# Patient Record
Sex: MALE | Race: WHITE | Employment: OTHER | URBAN - METROPOLITAN AREA
[De-identification: names, ages, dates, MRNs, and addresses within clinical notes are randomized per-mention and may not be internally consistent; named-entity substitution may affect disease eponyms.]

---

## 2019-01-01 ENCOUNTER — HOSPITAL ENCOUNTER (OUTPATIENT)
Dept: CT IMAGING | Age: 79
Discharge: HOME | End: 2019-10-31
Payer: MEDICARE

## 2019-01-01 ENCOUNTER — HOSPITAL ENCOUNTER (OUTPATIENT)
Age: 79
Discharge: HOME | End: 2019-12-16
Payer: MEDICARE

## 2019-01-01 ENCOUNTER — HOSPITAL ENCOUNTER (OUTPATIENT)
Dept: GENERAL RADIOLOGY | Age: 79
Discharge: HOME | End: 2019-10-24
Payer: MEDICARE

## 2019-01-01 ENCOUNTER — HOSPITAL ENCOUNTER (OUTPATIENT)
Dept: SPEECH THERAPY | Age: 79
Setting detail: THERAPIES SERIES
Discharge: HOME | End: 2019-10-24
Payer: MEDICARE

## 2019-01-01 ENCOUNTER — OFFICE VISIT (OUTPATIENT)
Dept: PULMONOLOGY | Age: 79
End: 2019-01-01
Payer: MEDICARE

## 2019-01-01 VITALS
WEIGHT: 121 LBS | BODY MASS INDEX: 17.92 KG/M2 | RESPIRATION RATE: 17 BRPM | SYSTOLIC BLOOD PRESSURE: 125 MMHG | HEART RATE: 77 BPM | HEIGHT: 69 IN | TEMPERATURE: 97.4 F | OXYGEN SATURATION: 94 % | DIASTOLIC BLOOD PRESSURE: 74 MMHG

## 2019-01-01 DIAGNOSIS — R13.10 DYSPHAGIA, UNSPECIFIED TYPE: ICD-10-CM

## 2019-01-01 DIAGNOSIS — R91.1 LUNG NODULE: ICD-10-CM

## 2019-01-01 DIAGNOSIS — Z77.090 ASBESTOS EXPOSURE: ICD-10-CM

## 2019-01-01 DIAGNOSIS — J43.2 CENTRILOBULAR EMPHYSEMA (HCC): Primary | ICD-10-CM

## 2019-01-01 DIAGNOSIS — J43.2 CENTRILOBULAR EMPHYSEMA (HCC): ICD-10-CM

## 2019-01-01 DIAGNOSIS — J84.10 GRANULOMATOUS LUNG DISEASE (HCC): ICD-10-CM

## 2019-01-01 DIAGNOSIS — R91.1 PULMONARY NODULE: ICD-10-CM

## 2019-01-01 DIAGNOSIS — J47.9 BRONCHIECTASIS WITHOUT COMPLICATION (HCC): ICD-10-CM

## 2019-01-01 DIAGNOSIS — R93.89 ABNORMAL CT SCAN, CHEST: ICD-10-CM

## 2019-01-01 DIAGNOSIS — J43.1 PANLOBULAR EMPHYSEMA (HCC): ICD-10-CM

## 2019-01-01 LAB — ALPHA-1 ANTITRYPSIN: 111 MG/DL (ref 90–200)

## 2019-01-01 PROCEDURE — 92526 ORAL FUNCTION THERAPY: CPT

## 2019-01-01 PROCEDURE — 74230 X-RAY XM SWLNG FUNCJ C+: CPT

## 2019-01-01 PROCEDURE — 71250 CT THORAX DX C-: CPT

## 2019-01-01 PROCEDURE — 82103 ALPHA-1-ANTITRYPSIN TOTAL: CPT

## 2019-01-01 PROCEDURE — 99204 OFFICE O/P NEW MOD 45 MIN: CPT | Performed by: INTERNAL MEDICINE

## 2019-01-01 PROCEDURE — 92611 MOTION FLUOROSCOPY/SWALLOW: CPT

## 2019-01-01 RX ORDER — POLYETHYLENE GLYCOL 3350 17 G/17G
POWDER, FOR SOLUTION ORAL
COMMUNITY
Start: 2018-11-20 | End: 2020-01-01 | Stop reason: ALTCHOICE

## 2019-01-01 RX ORDER — BUDESONIDE AND FORMOTEROL FUMARATE DIHYDRATE 160; 4.5 UG/1; UG/1
AEROSOL RESPIRATORY (INHALATION)
Status: ON HOLD | COMMUNITY
Start: 2019-01-01 | End: 2020-01-01 | Stop reason: HOSPADM

## 2019-01-01 RX ORDER — TIOTROPIUM BROMIDE 18 UG/1
CAPSULE ORAL; RESPIRATORY (INHALATION)
Status: ON HOLD | COMMUNITY
Start: 2019-01-01 | End: 2020-01-01 | Stop reason: HOSPADM

## 2019-01-01 RX ORDER — LEVOTHYROXINE SODIUM 0.05 MG/1
TABLET ORAL
COMMUNITY
Start: 2019-01-01

## 2019-01-01 RX ORDER — CARVEDILOL 6.25 MG/1
TABLET ORAL
COMMUNITY
Start: 2019-01-01 | End: 2020-01-01 | Stop reason: ALTCHOICE

## 2019-01-01 ASSESSMENT — ENCOUNTER SYMPTOMS
RHINORRHEA: 1
COUGH: 1
SHORTNESS OF BREATH: 1

## 2020-01-01 ENCOUNTER — OFFICE VISIT (OUTPATIENT)
Dept: PULMONOLOGY | Age: 80
End: 2020-01-01
Payer: MEDICARE

## 2020-01-01 ENCOUNTER — APPOINTMENT (OUTPATIENT)
Dept: CT IMAGING | Age: 80
DRG: 308 | End: 2020-01-01
Payer: MEDICARE

## 2020-01-01 ENCOUNTER — APPOINTMENT (OUTPATIENT)
Dept: GENERAL RADIOLOGY | Age: 80
DRG: 308 | End: 2020-01-01
Payer: MEDICARE

## 2020-01-01 ENCOUNTER — HOSPITAL ENCOUNTER (INPATIENT)
Age: 80
LOS: 5 days | Discharge: HOSPICE | DRG: 308 | End: 2020-07-11
Attending: EMERGENCY MEDICINE | Admitting: INTERNAL MEDICINE
Payer: MEDICARE

## 2020-01-01 ENCOUNTER — HOSPITAL ENCOUNTER (OUTPATIENT)
Dept: PULMONOLOGY | Age: 80
Discharge: HOME | End: 2020-02-10
Payer: MEDICARE

## 2020-01-01 ENCOUNTER — HOSPITAL ENCOUNTER (OUTPATIENT)
Dept: CT IMAGING | Age: 80
Discharge: HOME | End: 2020-02-03
Payer: MEDICARE

## 2020-01-01 ENCOUNTER — APPOINTMENT (OUTPATIENT)
Dept: ULTRASOUND IMAGING | Age: 80
DRG: 308 | End: 2020-01-01
Payer: MEDICARE

## 2020-01-01 VITALS
WEIGHT: 123.2 LBS | SYSTOLIC BLOOD PRESSURE: 126 MMHG | TEMPERATURE: 98.1 F | RESPIRATION RATE: 17 BRPM | DIASTOLIC BLOOD PRESSURE: 77 MMHG | OXYGEN SATURATION: 94 % | HEIGHT: 69 IN | HEART RATE: 112 BPM | BODY MASS INDEX: 18.25 KG/M2

## 2020-01-01 VITALS
TEMPERATURE: 97.6 F | HEIGHT: 71 IN | HEART RATE: 94 BPM | BODY MASS INDEX: 18.36 KG/M2 | DIASTOLIC BLOOD PRESSURE: 66 MMHG | OXYGEN SATURATION: 92 % | SYSTOLIC BLOOD PRESSURE: 108 MMHG | WEIGHT: 131.17 LBS | RESPIRATION RATE: 16 BRPM

## 2020-01-01 LAB
A/G RATIO: 1.1 (ref 1.1–2.2)
A/G RATIO: 1.5 (ref 1.1–2.2)
ALBUMIN SERPL-MCNC: 2.7 G/DL (ref 3.4–5)
ALBUMIN SERPL-MCNC: 2.7 G/DL (ref 3.4–5)
ALBUMIN SERPL-MCNC: 3 G/DL (ref 3.4–5)
ALBUMIN SERPL-MCNC: 3.2 G/DL (ref 3.4–5)
ALBUMIN SERPL-MCNC: 3.2 G/DL (ref 3.4–5)
ALP BLD-CCNC: 72 U/L (ref 40–129)
ALP BLD-CCNC: 76 U/L (ref 40–129)
ALP BLD-CCNC: 82 U/L (ref 40–129)
ALP BLD-CCNC: 83 U/L (ref 40–129)
ALP BLD-CCNC: 99 U/L (ref 40–129)
ALT SERPL-CCNC: 115 U/L (ref 10–40)
ALT SERPL-CCNC: 191 U/L (ref 10–40)
ALT SERPL-CCNC: 249 U/L (ref 10–40)
ALT SERPL-CCNC: 67 U/L (ref 10–40)
ALT SERPL-CCNC: 98 U/L (ref 10–40)
AMMONIA: 15 UMOL/L (ref 16–60)
ANION GAP SERPL CALCULATED.3IONS-SCNC: 10 MMOL/L (ref 3–16)
ANION GAP SERPL CALCULATED.3IONS-SCNC: 13 MMOL/L (ref 3–16)
ANION GAP SERPL CALCULATED.3IONS-SCNC: 13 MMOL/L (ref 3–16)
ANION GAP SERPL CALCULATED.3IONS-SCNC: 14 MMOL/L (ref 3–16)
ANION GAP SERPL CALCULATED.3IONS-SCNC: 15 MMOL/L (ref 3–16)
ANION GAP SERPL CALCULATED.3IONS-SCNC: 16 MMOL/L (ref 3–16)
AST SERPL-CCNC: 161 U/L (ref 15–37)
AST SERPL-CCNC: 406 U/L (ref 15–37)
AST SERPL-CCNC: 62 U/L (ref 15–37)
AST SERPL-CCNC: 82 U/L (ref 15–37)
AST SERPL-CCNC: 82 U/L (ref 15–37)
BACTERIA: ABNORMAL /HPF
BASOPHILS ABSOLUTE: 0 K/UL (ref 0–0.2)
BASOPHILS RELATIVE PERCENT: 0 %
BASOPHILS RELATIVE PERCENT: 0.1 %
BASOPHILS RELATIVE PERCENT: 0.3 %
BILIRUB SERPL-MCNC: 1.7 MG/DL (ref 0–1)
BILIRUB SERPL-MCNC: 1.9 MG/DL (ref 0–1)
BILIRUB SERPL-MCNC: 1.9 MG/DL (ref 0–1)
BILIRUB SERPL-MCNC: 2.1 MG/DL (ref 0–1)
BILIRUB SERPL-MCNC: 2.5 MG/DL (ref 0–1)
BILIRUBIN DIRECT: 0.7 MG/DL (ref 0–0.3)
BILIRUBIN DIRECT: 0.8 MG/DL (ref 0–0.3)
BILIRUBIN DIRECT: 1.1 MG/DL (ref 0–0.3)
BILIRUBIN URINE: ABNORMAL
BILIRUBIN, INDIRECT: 1.1 MG/DL (ref 0–1)
BILIRUBIN, INDIRECT: 1.2 MG/DL (ref 0–1)
BILIRUBIN, INDIRECT: 1.4 MG/DL (ref 0–1)
BLOOD, URINE: ABNORMAL
BUN BLDV-MCNC: 22 MG/DL (ref 7–20)
BUN BLDV-MCNC: 27 MG/DL (ref 7–20)
BUN BLDV-MCNC: 29 MG/DL (ref 7–20)
BUN BLDV-MCNC: 30 MG/DL (ref 7–20)
BUN BLDV-MCNC: 31 MG/DL (ref 7–20)
BUN BLDV-MCNC: 31 MG/DL (ref 7–20)
CALCIUM SERPL-MCNC: 7.5 MG/DL (ref 8.3–10.6)
CALCIUM SERPL-MCNC: 8.2 MG/DL (ref 8.3–10.6)
CALCIUM SERPL-MCNC: 8.3 MG/DL (ref 8.3–10.6)
CALCIUM SERPL-MCNC: 8.3 MG/DL (ref 8.3–10.6)
CALCIUM SERPL-MCNC: 8.4 MG/DL (ref 8.3–10.6)
CALCIUM SERPL-MCNC: 9.1 MG/DL (ref 8.3–10.6)
CHLORIDE BLD-SCNC: 93 MMOL/L (ref 99–110)
CHLORIDE BLD-SCNC: 95 MMOL/L (ref 99–110)
CHLORIDE BLD-SCNC: 95 MMOL/L (ref 99–110)
CHLORIDE BLD-SCNC: 96 MMOL/L (ref 99–110)
CHLORIDE BLD-SCNC: 97 MMOL/L (ref 99–110)
CHLORIDE BLD-SCNC: 98 MMOL/L (ref 99–110)
CHLORIDE URINE RANDOM: 30 MMOL/L
CLARITY: CLEAR
CO2: 19 MMOL/L (ref 21–32)
CO2: 20 MMOL/L (ref 21–32)
CO2: 21 MMOL/L (ref 21–32)
CO2: 21 MMOL/L (ref 21–32)
CO2: 24 MMOL/L (ref 21–32)
CO2: 26 MMOL/L (ref 21–32)
COLOR: ABNORMAL
CREAT SERPL-MCNC: 0.7 MG/DL (ref 0.8–1.3)
CREAT SERPL-MCNC: 0.8 MG/DL (ref 0.8–1.3)
CREAT SERPL-MCNC: 0.8 MG/DL (ref 0.8–1.3)
CREAT SERPL-MCNC: 1.1 MG/DL (ref 0.8–1.3)
CREAT SERPL-MCNC: 1.2 MG/DL (ref 0.8–1.3)
CREAT SERPL-MCNC: 1.3 MG/DL (ref 0.8–1.3)
DLCO %PRED: 32 %
DLCO PRED: NORMAL
DLCO/VA %PRED: NORMAL
DLCO/VA PRED: NORMAL
DLCO/VA: NORMAL
DLCO: NORMAL
EKG ATRIAL RATE: 153 BPM
EKG ATRIAL RATE: 95 BPM
EKG DIAGNOSIS: NORMAL
EKG DIAGNOSIS: NORMAL
EKG Q-T INTERVAL: 320 MS
EKG Q-T INTERVAL: 442 MS
EKG QRS DURATION: 124 MS
EKG QRS DURATION: 126 MS
EKG QTC CALCULATION (BAZETT): 515 MS
EKG QTC CALCULATION (BAZETT): 555 MS
EKG R AXIS: 263 DEGREES
EKG R AXIS: 263 DEGREES
EKG T AXIS: 72 DEGREES
EKG T AXIS: 72 DEGREES
EKG VENTRICULAR RATE: 156 BPM
EKG VENTRICULAR RATE: 95 BPM
EOSINOPHILS ABSOLUTE: 0 K/UL (ref 0–0.6)
EOSINOPHILS ABSOLUTE: 0 K/UL (ref 0–0.6)
EOSINOPHILS ABSOLUTE: 0.1 K/UL (ref 0–0.6)
EOSINOPHILS RELATIVE PERCENT: 0.1 %
EOSINOPHILS RELATIVE PERCENT: 0.2 %
EOSINOPHILS RELATIVE PERCENT: 0.5 %
EPITHELIAL CELLS, UA: ABNORMAL /HPF (ref 0–5)
ESTIMATED AVERAGE GLUCOSE: 128.4 MG/DL
EXPIRATORY TIME-POST: NORMAL
EXPIRATORY TIME: NORMAL
FEF 25-75% %CHNG: NORMAL
FEF 25-75% %PRED-POST: NORMAL
FEF 25-75% %PRED-PRE: NORMAL
FEF 25-75% PRED: NORMAL
FEF 25-75%-POST: NORMAL
FEF 25-75%-PRE: NORMAL
FEV1 %PRED-POST: 47 %
FEV1 %PRED-PRE: 42 %
FEV1 PRED: NORMAL
FEV1-POST: NORMAL
FEV1-PRE: NORMAL
FEV1/FVC %PRED-POST: NORMAL
FEV1/FVC %PRED-PRE: NORMAL
FEV1/FVC PRED: NORMAL
FEV1/FVC-POST: 50 %
FEV1/FVC-PRE: 48 %
FVC %PRED-POST: NORMAL
FVC %PRED-PRE: NORMAL
FVC PRED: NORMAL
FVC-POST: NORMAL
FVC-PRE: NORMAL
GAW %PRED: NORMAL
GAW PRED: NORMAL
GAW: NORMAL
GFR AFRICAN AMERICAN: >60
GFR NON-AFRICAN AMERICAN: 53
GFR NON-AFRICAN AMERICAN: 58
GFR NON-AFRICAN AMERICAN: >60
GLOBULIN: 2.2 G/DL
GLOBULIN: 2.9 G/DL
GLUCOSE BLD-MCNC: 109 MG/DL (ref 70–99)
GLUCOSE BLD-MCNC: 113 MG/DL (ref 70–99)
GLUCOSE BLD-MCNC: 118 MG/DL (ref 70–99)
GLUCOSE BLD-MCNC: 132 MG/DL (ref 70–99)
GLUCOSE BLD-MCNC: 172 MG/DL (ref 70–99)
GLUCOSE BLD-MCNC: 98 MG/DL (ref 70–99)
GLUCOSE URINE: NEGATIVE MG/DL
HAV IGM SER IA-ACNC: NORMAL
HBA1C MFR BLD: 6.1 %
HCT VFR BLD CALC: 45.9 % (ref 40.5–52.5)
HCT VFR BLD CALC: 46 % (ref 40.5–52.5)
HCT VFR BLD CALC: 48 % (ref 40.5–52.5)
HCT VFR BLD CALC: 53 % (ref 40.5–52.5)
HEMATOLOGY PATH CONSULT: NORMAL
HEMATOLOGY PATH CONSULT: YES
HEMOGLOBIN: 15.3 G/DL (ref 13.5–17.5)
HEMOGLOBIN: 15.6 G/DL (ref 13.5–17.5)
HEMOGLOBIN: 16.2 G/DL (ref 13.5–17.5)
HEMOGLOBIN: 17.9 G/DL (ref 13.5–17.5)
HEPATITIS B CORE IGM ANTIBODY: NORMAL
HEPATITIS B SURFACE ANTIGEN INTERPRETATION: NORMAL
HEPATITIS C ANTIBODY INTERPRETATION: NORMAL
IC %PRED: NORMAL
IC PRED: NORMAL
IC: NORMAL
INR BLD: 1.99 (ref 0.86–1.14)
INR BLD: 2.14 (ref 0.86–1.14)
INR BLD: 2.34 (ref 0.86–1.14)
KETONES, URINE: NEGATIVE MG/DL
LEUKOCYTE ESTERASE, URINE: NEGATIVE
LV EF: 13 %
LVEF MODALITY: NORMAL
LYMPHOCYTES ABSOLUTE: 0.5 K/UL (ref 1–5.1)
LYMPHOCYTES ABSOLUTE: 0.6 K/UL (ref 1–5.1)
LYMPHOCYTES ABSOLUTE: 0.8 K/UL (ref 1–5.1)
LYMPHOCYTES RELATIVE PERCENT: 4.5 %
LYMPHOCYTES RELATIVE PERCENT: 5 %
LYMPHOCYTES RELATIVE PERCENT: 6.6 %
MCH RBC QN AUTO: 32.3 PG (ref 26–34)
MCH RBC QN AUTO: 32.4 PG (ref 26–34)
MCH RBC QN AUTO: 32.6 PG (ref 26–34)
MCH RBC QN AUTO: 33 PG (ref 26–34)
MCHC RBC AUTO-ENTMCNC: 33.3 G/DL (ref 31–36)
MCHC RBC AUTO-ENTMCNC: 33.7 G/DL (ref 31–36)
MCHC RBC AUTO-ENTMCNC: 33.7 G/DL (ref 31–36)
MCHC RBC AUTO-ENTMCNC: 33.9 G/DL (ref 31–36)
MCV RBC AUTO: 96.1 FL (ref 80–100)
MCV RBC AUTO: 96.7 FL (ref 80–100)
MCV RBC AUTO: 96.8 FL (ref 80–100)
MCV RBC AUTO: 97.4 FL (ref 80–100)
MEP: NORMAL
MICROSCOPIC EXAMINATION: YES
MIP: NORMAL
MONOCYTES ABSOLUTE: 1 K/UL (ref 0–1.3)
MONOCYTES ABSOLUTE: 1.1 K/UL (ref 0–1.3)
MONOCYTES ABSOLUTE: 1.1 K/UL (ref 0–1.3)
MONOCYTES RELATIVE PERCENT: 8.5 %
MONOCYTES RELATIVE PERCENT: 8.7 %
MONOCYTES RELATIVE PERCENT: 8.8 %
MVV %PRED-PRE: NORMAL
MVV PRED: NORMAL
MVV-PRE: NORMAL
NEUTROPHILS ABSOLUTE: 10.4 K/UL (ref 1.7–7.7)
NEUTROPHILS ABSOLUTE: 10.5 K/UL (ref 1.7–7.7)
NEUTROPHILS ABSOLUTE: 10.8 K/UL (ref 1.7–7.7)
NEUTROPHILS RELATIVE PERCENT: 84.1 %
NEUTROPHILS RELATIVE PERCENT: 85.8 %
NEUTROPHILS RELATIVE PERCENT: 86.8 %
NITRITE, URINE: NEGATIVE
OSMOLALITY URINE: 781 MOSM/KG (ref 390–1070)
OSMOLALITY: 281 MOSM/KG (ref 280–301)
PDW BLD-RTO: 14.5 % (ref 12.4–15.4)
PDW BLD-RTO: 14.7 % (ref 12.4–15.4)
PDW BLD-RTO: 14.7 % (ref 12.4–15.4)
PDW BLD-RTO: 14.8 % (ref 12.4–15.4)
PEF %PRED-POST: NORMAL
PEF %PRED-PRE: NORMAL
PEF PRED: NORMAL
PEF%CHNG: NORMAL
PEF-POST: NORMAL
PEF-PRE: NORMAL
PH UA: 5.5 (ref 5–8)
PLATELET # BLD: 15 K/UL (ref 135–450)
PLATELET # BLD: 58 K/UL (ref 135–450)
PLATELET # BLD: 61 K/UL (ref 135–450)
PLATELET # BLD: 64 K/UL (ref 135–450)
PLATELET SLIDE REVIEW: ABNORMAL
PMV BLD AUTO: 11.1 FL (ref 5–10.5)
PMV BLD AUTO: 11.8 FL (ref 5–10.5)
PMV BLD AUTO: 12.2 FL (ref 5–10.5)
PMV BLD AUTO: 12.7 FL (ref 5–10.5)
POTASSIUM SERPL-SCNC: 3.6 MMOL/L (ref 3.5–5.1)
POTASSIUM SERPL-SCNC: 3.7 MMOL/L (ref 3.5–5.1)
POTASSIUM SERPL-SCNC: 4.1 MMOL/L (ref 3.5–5.1)
POTASSIUM SERPL-SCNC: 4.3 MMOL/L (ref 3.5–5.1)
POTASSIUM SERPL-SCNC: 4.7 MMOL/L (ref 3.5–5.1)
POTASSIUM SERPL-SCNC: 4.7 MMOL/L (ref 3.5–5.1)
POTASSIUM, UR: 46.8 MMOL/L
PRO-BNP: ABNORMAL PG/ML (ref 0–449)
PROSTATE SPECIFIC ANTIGEN: 0.92 NG/ML (ref 0–4)
PROTEIN UA: 30 MG/DL
PROTHROMBIN TIME: 23.2 SEC (ref 10–13.2)
PROTHROMBIN TIME: 25 SEC (ref 10–13.2)
PROTHROMBIN TIME: 27.4 SEC (ref 10–13.2)
RAW %PRED: NORMAL
RAW PRED: NORMAL
RAW: NORMAL
RBC # BLD: 4.73 M/UL (ref 4.2–5.9)
RBC # BLD: 4.74 M/UL (ref 4.2–5.9)
RBC # BLD: 4.97 M/UL (ref 4.2–5.9)
RBC # BLD: 5.52 M/UL (ref 4.2–5.9)
RBC UA: ABNORMAL /HPF (ref 0–4)
REASON FOR REJECTION: NORMAL
REJECTED TEST: NORMAL
RV %PRED: NORMAL
RV PRED: NORMAL
RV: NORMAL
SLIDE REVIEW: ABNORMAL
SODIUM BLD-SCNC: 130 MMOL/L (ref 136–145)
SODIUM BLD-SCNC: 130 MMOL/L (ref 136–145)
SODIUM BLD-SCNC: 131 MMOL/L (ref 136–145)
SODIUM BLD-SCNC: 131 MMOL/L (ref 136–145)
SODIUM BLD-SCNC: 132 MMOL/L (ref 136–145)
SODIUM BLD-SCNC: 132 MMOL/L (ref 136–145)
SODIUM URINE: <20 MMOL/L
SPECIFIC GRAVITY UA: >=1.03 (ref 1–1.03)
SVC %PRED: NORMAL
SVC PRED: NORMAL
SVC: NORMAL
T4 FREE: 1.7 NG/DL (ref 0.9–1.8)
TLC %PRED: 100 %
TLC PRED: NORMAL
TLC: NORMAL
TOTAL PROTEIN: 4.7 G/DL (ref 6.4–8.2)
TOTAL PROTEIN: 5.1 G/DL (ref 6.4–8.2)
TOTAL PROTEIN: 5.3 G/DL (ref 6.4–8.2)
TOTAL PROTEIN: 5.4 G/DL (ref 6.4–8.2)
TOTAL PROTEIN: 6.1 G/DL (ref 6.4–8.2)
TROPONIN: 0.04 NG/ML
TSH REFLEX: 6.06 UIU/ML (ref 0.27–4.2)
URINE TYPE: ABNORMAL
UROBILINOGEN, URINE: 1 E.U./DL
VA %PRED: NORMAL
VA PRED: NORMAL
VA: NORMAL
VTG %PRED: NORMAL
VTG PRED: NORMAL
VTG: NORMAL
WBC # BLD: 10 K/UL (ref 4–11)
WBC # BLD: 12 K/UL (ref 4–11)
WBC # BLD: 12.4 K/UL (ref 4–11)
WBC # BLD: 12.6 K/UL (ref 4–11)
WBC UA: ABNORMAL /HPF (ref 0–5)

## 2020-01-01 PROCEDURE — G0009 ADMIN PNEUMOCOCCAL VACCINE: HCPCS | Performed by: INTERNAL MEDICINE

## 2020-01-01 PROCEDURE — 80048 BASIC METABOLIC PNL TOTAL CA: CPT

## 2020-01-01 PROCEDURE — 97116 GAIT TRAINING THERAPY: CPT

## 2020-01-01 PROCEDURE — 85610 PROTHROMBIN TIME: CPT

## 2020-01-01 PROCEDURE — 85025 COMPLETE CBC W/AUTO DIFF WBC: CPT

## 2020-01-01 PROCEDURE — 83935 ASSAY OF URINE OSMOLALITY: CPT

## 2020-01-01 PROCEDURE — 51702 INSERT TEMP BLADDER CATH: CPT

## 2020-01-01 PROCEDURE — 1200000000 HC SEMI PRIVATE

## 2020-01-01 PROCEDURE — 71250 CT THORAX DX C-: CPT

## 2020-01-01 PROCEDURE — 6370000000 HC RX 637 (ALT 250 FOR IP): Performed by: INTERNAL MEDICINE

## 2020-01-01 PROCEDURE — 96374 THER/PROPH/DIAG INJ IV PUSH: CPT

## 2020-01-01 PROCEDURE — 36415 COLL VENOUS BLD VENIPUNCTURE: CPT

## 2020-01-01 PROCEDURE — 97530 THERAPEUTIC ACTIVITIES: CPT

## 2020-01-01 PROCEDURE — 80053 COMPREHEN METABOLIC PANEL: CPT

## 2020-01-01 PROCEDURE — 6370000000 HC RX 637 (ALT 250 FOR IP): Performed by: UROLOGY

## 2020-01-01 PROCEDURE — 84484 ASSAY OF TROPONIN QUANT: CPT

## 2020-01-01 PROCEDURE — 85027 COMPLETE CBC AUTOMATED: CPT

## 2020-01-01 PROCEDURE — 82436 ASSAY OF URINE CHLORIDE: CPT

## 2020-01-01 PROCEDURE — 93010 ELECTROCARDIOGRAM REPORT: CPT | Performed by: INTERNAL MEDICINE

## 2020-01-01 PROCEDURE — 2580000003 HC RX 258: Performed by: INTERNAL MEDICINE

## 2020-01-01 PROCEDURE — 2580000003 HC RX 258: Performed by: NURSE PRACTITIONER

## 2020-01-01 PROCEDURE — 83930 ASSAY OF BLOOD OSMOLALITY: CPT

## 2020-01-01 PROCEDURE — 94640 AIRWAY INHALATION TREATMENT: CPT

## 2020-01-01 PROCEDURE — 6360000002 HC RX W HCPCS: Performed by: NURSE PRACTITIONER

## 2020-01-01 PROCEDURE — 70450 CT HEAD/BRAIN W/O DYE: CPT

## 2020-01-01 PROCEDURE — 84443 ASSAY THYROID STIM HORMONE: CPT

## 2020-01-01 PROCEDURE — 99233 SBSQ HOSP IP/OBS HIGH 50: CPT | Performed by: INTERNAL MEDICINE

## 2020-01-01 PROCEDURE — 94726 PLETHYSMOGRAPHY LUNG VOLUMES: CPT

## 2020-01-01 PROCEDURE — 94060 EVALUATION OF WHEEZING: CPT

## 2020-01-01 PROCEDURE — 2500000003 HC RX 250 WO HCPCS: Performed by: INTERNAL MEDICINE

## 2020-01-01 PROCEDURE — 6360000004 HC RX CONTRAST MEDICATION: Performed by: INTERNAL MEDICINE

## 2020-01-01 PROCEDURE — 80076 HEPATIC FUNCTION PANEL: CPT

## 2020-01-01 PROCEDURE — 99284 EMERGENCY DEPT VISIT MOD MDM: CPT

## 2020-01-01 PROCEDURE — 97162 PT EVAL MOD COMPLEX 30 MIN: CPT

## 2020-01-01 PROCEDURE — C8929 TTE W OR WO FOL WCON,DOPPLER: HCPCS

## 2020-01-01 PROCEDURE — 97535 SELF CARE MNGMENT TRAINING: CPT

## 2020-01-01 PROCEDURE — 73080 X-RAY EXAM OF ELBOW: CPT

## 2020-01-01 PROCEDURE — 6370000000 HC RX 637 (ALT 250 FOR IP): Performed by: NURSE PRACTITIONER

## 2020-01-01 PROCEDURE — 90732 PPSV23 VACC 2 YRS+ SUBQ/IM: CPT | Performed by: INTERNAL MEDICINE

## 2020-01-01 PROCEDURE — 93005 ELECTROCARDIOGRAM TRACING: CPT | Performed by: EMERGENCY MEDICINE

## 2020-01-01 PROCEDURE — 71045 X-RAY EXAM CHEST 1 VIEW: CPT

## 2020-01-01 PROCEDURE — 83880 ASSAY OF NATRIURETIC PEPTIDE: CPT

## 2020-01-01 PROCEDURE — 84133 ASSAY OF URINE POTASSIUM: CPT

## 2020-01-01 PROCEDURE — 84153 ASSAY OF PSA TOTAL: CPT

## 2020-01-01 PROCEDURE — 2500000003 HC RX 250 WO HCPCS: Performed by: NURSE PRACTITIONER

## 2020-01-01 PROCEDURE — 6360000002 HC RX W HCPCS: Performed by: INTERNAL MEDICINE

## 2020-01-01 PROCEDURE — 99223 1ST HOSP IP/OBS HIGH 75: CPT | Performed by: INTERNAL MEDICINE

## 2020-01-01 PROCEDURE — 97166 OT EVAL MOD COMPLEX 45 MIN: CPT

## 2020-01-01 PROCEDURE — 84300 ASSAY OF URINE SODIUM: CPT

## 2020-01-01 PROCEDURE — 94618 PULMONARY STRESS TESTING: CPT

## 2020-01-01 PROCEDURE — 80074 ACUTE HEPATITIS PANEL: CPT

## 2020-01-01 PROCEDURE — 51798 US URINE CAPACITY MEASURE: CPT

## 2020-01-01 PROCEDURE — 83036 HEMOGLOBIN GLYCOSYLATED A1C: CPT

## 2020-01-01 PROCEDURE — 99232 SBSQ HOSP IP/OBS MODERATE 35: CPT | Performed by: NURSE PRACTITIONER

## 2020-01-01 PROCEDURE — 82140 ASSAY OF AMMONIA: CPT

## 2020-01-01 PROCEDURE — 94729 DIFFUSING CAPACITY: CPT

## 2020-01-01 PROCEDURE — 84439 ASSAY OF FREE THYROXINE: CPT

## 2020-01-01 PROCEDURE — 99233 SBSQ HOSP IP/OBS HIGH 50: CPT | Performed by: NURSE PRACTITIONER

## 2020-01-01 PROCEDURE — 76705 ECHO EXAM OF ABDOMEN: CPT

## 2020-01-01 PROCEDURE — 81001 URINALYSIS AUTO W/SCOPE: CPT

## 2020-01-01 PROCEDURE — 99214 OFFICE O/P EST MOD 30 MIN: CPT | Performed by: INTERNAL MEDICINE

## 2020-01-01 RX ORDER — SODIUM CHLORIDE 9 MG/ML
1000 INJECTION, SOLUTION INTRAVENOUS CONTINUOUS
Status: DISCONTINUED | OUTPATIENT
Start: 2020-01-01 | End: 2020-01-01

## 2020-01-01 RX ORDER — METOPROLOL SUCCINATE 25 MG/1
25 TABLET, EXTENDED RELEASE ORAL DAILY
Qty: 30 TABLET | Refills: 3 | Status: SHIPPED | OUTPATIENT
Start: 2020-01-01

## 2020-01-01 RX ORDER — METOPROLOL SUCCINATE 25 MG/1
25 TABLET, EXTENDED RELEASE ORAL DAILY
Status: DISCONTINUED | OUTPATIENT
Start: 2020-01-01 | End: 2020-01-01 | Stop reason: HOSPADM

## 2020-01-01 RX ORDER — DILTIAZEM HYDROCHLORIDE 5 MG/ML
10 INJECTION INTRAVENOUS ONCE
Status: COMPLETED | OUTPATIENT
Start: 2020-01-01 | End: 2020-01-01

## 2020-01-01 RX ORDER — TAMSULOSIN HYDROCHLORIDE 0.4 MG/1
0.4 CAPSULE ORAL DAILY
Status: DISCONTINUED | OUTPATIENT
Start: 2020-01-01 | End: 2020-01-01 | Stop reason: HOSPADM

## 2020-01-01 RX ORDER — LORAZEPAM 2 MG/ML
1 CONCENTRATE ORAL EVERY 8 HOURS PRN
Qty: 30 ML | Refills: 0 | Status: SHIPPED | OUTPATIENT
Start: 2020-01-01 | End: 2020-01-01 | Stop reason: HOSPADM

## 2020-01-01 RX ORDER — BUDESONIDE AND FORMOTEROL FUMARATE DIHYDRATE 160; 4.5 UG/1; UG/1
2 AEROSOL RESPIRATORY (INHALATION) 2 TIMES DAILY
Status: DISCONTINUED | OUTPATIENT
Start: 2020-01-01 | End: 2020-01-01 | Stop reason: HOSPADM

## 2020-01-01 RX ORDER — ONDANSETRON 2 MG/ML
4 INJECTION INTRAMUSCULAR; INTRAVENOUS EVERY 6 HOURS PRN
Status: DISCONTINUED | OUTPATIENT
Start: 2020-01-01 | End: 2020-01-01

## 2020-01-01 RX ORDER — QUETIAPINE FUMARATE 25 MG/1
50 TABLET, FILM COATED ORAL ONCE
Status: COMPLETED | OUTPATIENT
Start: 2020-01-01 | End: 2020-01-01

## 2020-01-01 RX ORDER — LEVOTHYROXINE SODIUM 0.05 MG/1
50 TABLET ORAL DAILY
Status: DISCONTINUED | OUTPATIENT
Start: 2020-01-01 | End: 2020-01-01

## 2020-01-01 RX ORDER — ACETAMINOPHEN 650 MG/1
650 SUPPOSITORY RECTAL EVERY 6 HOURS PRN
Status: DISCONTINUED | OUTPATIENT
Start: 2020-01-01 | End: 2020-01-01 | Stop reason: HOSPADM

## 2020-01-01 RX ORDER — MORPHINE SULFATE 100 MG/5ML
10 SOLUTION ORAL
Qty: 30 ML | Refills: 0 | Status: SHIPPED | OUTPATIENT
Start: 2020-01-01 | End: 2020-07-16

## 2020-01-01 RX ORDER — HALOPERIDOL 5 MG/ML
2 INJECTION INTRAMUSCULAR EVERY 6 HOURS PRN
Status: DISCONTINUED | OUTPATIENT
Start: 2020-01-01 | End: 2020-01-01 | Stop reason: HOSPADM

## 2020-01-01 RX ORDER — ASPIRIN 81 MG/1
81 TABLET ORAL ONCE
Status: COMPLETED | OUTPATIENT
Start: 2020-01-01 | End: 2020-01-01

## 2020-01-01 RX ORDER — FINASTERIDE 5 MG/1
5 TABLET, FILM COATED ORAL DAILY
Status: DISCONTINUED | OUTPATIENT
Start: 2020-01-01 | End: 2020-01-01 | Stop reason: HOSPADM

## 2020-01-01 RX ORDER — HALOPERIDOL 5 MG/ML
5 INJECTION INTRAMUSCULAR ONCE
Status: COMPLETED | OUTPATIENT
Start: 2020-01-01 | End: 2020-01-01

## 2020-01-01 RX ORDER — SODIUM CHLORIDE 0.9 % (FLUSH) 0.9 %
10 SYRINGE (ML) INJECTION PRN
Status: DISCONTINUED | OUTPATIENT
Start: 2020-01-01 | End: 2020-01-01 | Stop reason: HOSPADM

## 2020-01-01 RX ORDER — POLYETHYLENE GLYCOL 3350 17 G/17G
17 POWDER, FOR SOLUTION ORAL DAILY PRN
Status: DISCONTINUED | OUTPATIENT
Start: 2020-01-01 | End: 2020-01-01 | Stop reason: HOSPADM

## 2020-01-01 RX ORDER — DILTIAZEM HYDROCHLORIDE 60 MG/1
30 TABLET, FILM COATED ORAL EVERY 6 HOURS SCHEDULED
Status: DISCONTINUED | OUTPATIENT
Start: 2020-01-01 | End: 2020-01-01

## 2020-01-01 RX ORDER — PROMETHAZINE HYDROCHLORIDE 25 MG/1
12.5 TABLET ORAL EVERY 6 HOURS PRN
Status: DISCONTINUED | OUTPATIENT
Start: 2020-01-01 | End: 2020-01-01

## 2020-01-01 RX ORDER — ALBUTEROL SULFATE 90 UG/1
4 AEROSOL, METERED RESPIRATORY (INHALATION) ONCE
Status: COMPLETED | OUTPATIENT
Start: 2020-01-01 | End: 2020-01-01

## 2020-01-01 RX ORDER — FUROSEMIDE 10 MG/ML
20 INJECTION INTRAMUSCULAR; INTRAVENOUS ONCE
Status: COMPLETED | OUTPATIENT
Start: 2020-01-01 | End: 2020-01-01

## 2020-01-01 RX ORDER — SODIUM CHLORIDE 0.9 % (FLUSH) 0.9 %
10 SYRINGE (ML) INJECTION EVERY 12 HOURS SCHEDULED
Status: DISCONTINUED | OUTPATIENT
Start: 2020-01-01 | End: 2020-01-01

## 2020-01-01 RX ORDER — ACETAMINOPHEN 325 MG/1
650 TABLET ORAL EVERY 6 HOURS PRN
Status: DISCONTINUED | OUTPATIENT
Start: 2020-01-01 | End: 2020-01-01 | Stop reason: HOSPADM

## 2020-01-01 RX ORDER — LORAZEPAM 0.5 MG/1
0.5 TABLET ORAL EVERY 6 HOURS PRN
Qty: 30 TABLET | Refills: 0 | Status: SHIPPED | OUTPATIENT
Start: 2020-01-01 | End: 2020-07-25

## 2020-01-01 RX ADMIN — HALOPERIDOL LACTATE 5 MG: 5 INJECTION INTRAMUSCULAR at 02:02

## 2020-01-01 RX ADMIN — Medication 2 PUFF: at 07:43

## 2020-01-01 RX ADMIN — METOPROLOL TARTRATE 25 MG: 25 TABLET, FILM COATED ORAL at 12:24

## 2020-01-01 RX ADMIN — DILTIAZEM HYDROCHLORIDE 30 MG: 60 TABLET, FILM COATED ORAL at 12:00

## 2020-01-01 RX ADMIN — LEVOTHYROXINE SODIUM 75 MCG: 0.05 TABLET ORAL at 05:25

## 2020-01-01 RX ADMIN — DILTIAZEM HYDROCHLORIDE 30 MG: 60 TABLET, FILM COATED ORAL at 07:07

## 2020-01-01 RX ADMIN — METOPROLOL TARTRATE 25 MG: 25 TABLET, FILM COATED ORAL at 21:24

## 2020-01-01 RX ADMIN — SODIUM CHLORIDE 1000 ML: 9 INJECTION, SOLUTION INTRAVENOUS at 12:32

## 2020-01-01 RX ADMIN — Medication 2 PUFF: at 19:57

## 2020-01-01 RX ADMIN — FINASTERIDE 5 MG: 5 TABLET, FILM COATED ORAL at 09:49

## 2020-01-01 RX ADMIN — SODIUM BICARBONATE: 84 INJECTION, SOLUTION INTRAVENOUS at 19:44

## 2020-01-01 RX ADMIN — Medication 2 PUFF: at 07:57

## 2020-01-01 RX ADMIN — TAMSULOSIN HYDROCHLORIDE 0.4 MG: 0.4 CAPSULE ORAL at 12:24

## 2020-01-01 RX ADMIN — Medication 2 PUFF: at 08:15

## 2020-01-01 RX ADMIN — Medication 2 PUFF: at 20:00

## 2020-01-01 RX ADMIN — METOPROLOL TARTRATE 25 MG: 25 TABLET, FILM COATED ORAL at 21:22

## 2020-01-01 RX ADMIN — DILTIAZEM HYDROCHLORIDE 10 MG: 5 INJECTION INTRAVENOUS at 13:18

## 2020-01-01 RX ADMIN — DILTIAZEM HYDROCHLORIDE 30 MG: 60 TABLET, FILM COATED ORAL at 18:02

## 2020-01-01 RX ADMIN — DILTIAZEM HYDROCHLORIDE 30 MG: 60 TABLET, FILM COATED ORAL at 17:48

## 2020-01-01 RX ADMIN — Medication 2 PUFF: at 19:15

## 2020-01-01 RX ADMIN — DILTIAZEM HYDROCHLORIDE 30 MG: 60 TABLET, FILM COATED ORAL at 05:44

## 2020-01-01 RX ADMIN — DILTIAZEM HYDROCHLORIDE 30 MG: 60 TABLET, FILM COATED ORAL at 12:12

## 2020-01-01 RX ADMIN — DILTIAZEM HYDROCHLORIDE 30 MG: 60 TABLET, FILM COATED ORAL at 23:44

## 2020-01-01 RX ADMIN — LEVOTHYROXINE SODIUM 50 MCG: 0.05 TABLET ORAL at 05:43

## 2020-01-01 RX ADMIN — FUROSEMIDE 20 MG: 10 INJECTION, SOLUTION INTRAMUSCULAR; INTRAVENOUS at 16:17

## 2020-01-01 RX ADMIN — METOPROLOL TARTRATE 25 MG: 25 TABLET, FILM COATED ORAL at 11:00

## 2020-01-01 RX ADMIN — TAMSULOSIN HYDROCHLORIDE 0.4 MG: 0.4 CAPSULE ORAL at 21:23

## 2020-01-01 RX ADMIN — SODIUM CHLORIDE 1000 ML: 9 INJECTION, SOLUTION INTRAVENOUS at 13:51

## 2020-01-01 RX ADMIN — ACETAMINOPHEN 650 MG: 325 TABLET ORAL at 20:33

## 2020-01-01 RX ADMIN — QUETIAPINE FUMARATE 50 MG: 25 TABLET ORAL at 21:20

## 2020-01-01 RX ADMIN — METOPROLOL TARTRATE 25 MG: 25 TABLET, FILM COATED ORAL at 09:34

## 2020-01-01 RX ADMIN — FINASTERIDE 5 MG: 5 TABLET, FILM COATED ORAL at 18:01

## 2020-01-01 RX ADMIN — PERFLUTREN 1.1 MG: 6.52 INJECTION, SUSPENSION INTRAVENOUS at 08:01

## 2020-01-01 RX ADMIN — APIXABAN 2.5 MG: 5 TABLET, FILM COATED ORAL at 21:11

## 2020-01-01 RX ADMIN — TIOTROPIUM BROMIDE INHALATION SPRAY 2 PUFF: 3.12 SPRAY, METERED RESPIRATORY (INHALATION) at 07:57

## 2020-01-01 RX ADMIN — DILTIAZEM HYDROCHLORIDE 30 MG: 60 TABLET, FILM COATED ORAL at 18:44

## 2020-01-01 RX ADMIN — ASPIRIN 81 MG: 81 TABLET, COATED ORAL at 18:44

## 2020-01-01 RX ADMIN — TAMSULOSIN HYDROCHLORIDE 0.4 MG: 0.4 CAPSULE ORAL at 09:49

## 2020-01-01 RX ADMIN — APIXABAN 2.5 MG: 5 TABLET, FILM COATED ORAL at 12:24

## 2020-01-01 RX ADMIN — TIOTROPIUM BROMIDE INHALATION SPRAY 2 PUFF: 3.12 SPRAY, METERED RESPIRATORY (INHALATION) at 08:59

## 2020-01-01 RX ADMIN — METOPROLOL SUCCINATE 25 MG: 25 TABLET, EXTENDED RELEASE ORAL at 20:33

## 2020-01-01 RX ADMIN — Medication 2 PUFF: at 19:56

## 2020-01-01 RX ADMIN — APIXABAN 2.5 MG: 5 TABLET, FILM COATED ORAL at 08:44

## 2020-01-01 RX ADMIN — PHYTONADIONE 10 MG: 10 INJECTION, EMULSION INTRAMUSCULAR; INTRAVENOUS; SUBCUTANEOUS at 15:54

## 2020-01-01 RX ADMIN — METOPROLOL SUCCINATE 25 MG: 25 TABLET, EXTENDED RELEASE ORAL at 09:49

## 2020-01-01 RX ADMIN — FINASTERIDE 5 MG: 5 TABLET, FILM COATED ORAL at 09:33

## 2020-01-01 RX ADMIN — METOPROLOL TARTRATE 25 MG: 25 TABLET, FILM COATED ORAL at 22:46

## 2020-01-01 RX ADMIN — DILTIAZEM HYDROCHLORIDE 30 MG: 60 TABLET, FILM COATED ORAL at 12:25

## 2020-01-01 RX ADMIN — SODIUM CHLORIDE 1000 ML: 9 INJECTION, SOLUTION INTRAVENOUS at 19:32

## 2020-01-01 RX ADMIN — TIOTROPIUM BROMIDE INHALATION SPRAY 2 PUFF: 3.12 SPRAY, METERED RESPIRATORY (INHALATION) at 07:39

## 2020-01-01 RX ADMIN — DILTIAZEM HYDROCHLORIDE 30 MG: 60 TABLET, FILM COATED ORAL at 19:02

## 2020-01-01 RX ADMIN — Medication 2 PUFF: at 07:39

## 2020-01-01 RX ADMIN — Medication 10 ML: at 08:31

## 2020-01-01 RX ADMIN — TIOTROPIUM BROMIDE INHALATION SPRAY 2 PUFF: 3.12 SPRAY, METERED RESPIRATORY (INHALATION) at 08:15

## 2020-01-01 RX ADMIN — Medication 2 PUFF: at 20:41

## 2020-01-01 RX ADMIN — DILTIAZEM HYDROCHLORIDE 30 MG: 60 TABLET, FILM COATED ORAL at 01:47

## 2020-01-01 RX ADMIN — Medication 6 MG: at 22:46

## 2020-01-01 RX ADMIN — Medication 2 PUFF: at 08:59

## 2020-01-01 RX ADMIN — SODIUM BICARBONATE: 84 INJECTION, SOLUTION INTRAVENOUS at 21:49

## 2020-01-01 RX ADMIN — Medication 10 ML: at 08:44

## 2020-01-01 RX ADMIN — APIXABAN 2.5 MG: 5 TABLET, FILM COATED ORAL at 21:23

## 2020-01-01 RX ADMIN — SODIUM BICARBONATE: 84 INJECTION, SOLUTION INTRAVENOUS at 09:33

## 2020-01-01 RX ADMIN — LEVOTHYROXINE SODIUM 75 MCG: 0.05 TABLET ORAL at 05:59

## 2020-01-01 RX ADMIN — DILTIAZEM HYDROCHLORIDE 30 MG: 60 TABLET, FILM COATED ORAL at 00:33

## 2020-01-01 RX ADMIN — LEVOTHYROXINE SODIUM 75 MCG: 0.05 TABLET ORAL at 05:23

## 2020-01-01 RX ADMIN — DILTIAZEM HYDROCHLORIDE 30 MG: 60 TABLET, FILM COATED ORAL at 05:24

## 2020-01-01 RX ADMIN — Medication 4 PUFF: at 14:50

## 2020-01-01 RX ADMIN — TIOTROPIUM BROMIDE INHALATION SPRAY 2 PUFF: 3.12 SPRAY, METERED RESPIRATORY (INHALATION) at 07:44

## 2020-01-01 RX ADMIN — TAMSULOSIN HYDROCHLORIDE 0.4 MG: 0.4 CAPSULE ORAL at 09:33

## 2020-01-01 RX ADMIN — DILTIAZEM HYDROCHLORIDE 30 MG: 60 TABLET, FILM COATED ORAL at 05:26

## 2020-01-01 RX ADMIN — DILTIAZEM HYDROCHLORIDE 30 MG: 60 TABLET, FILM COATED ORAL at 12:34

## 2020-01-01 RX ADMIN — Medication 6 MG: at 20:33

## 2020-01-01 RX ADMIN — DILTIAZEM HYDROCHLORIDE 10 MG: 5 INJECTION INTRAVENOUS at 19:26

## 2020-01-01 RX ADMIN — APIXABAN 2.5 MG: 5 TABLET, FILM COATED ORAL at 21:20

## 2020-01-01 ASSESSMENT — PAIN SCALES - PAIN ASSESSMENT IN ADVANCED DEMENTIA (PAINAD)
CONSOLABILITY: 0
FACIALEXPRESSION: 0
NEGVOCALIZATION: 0
FACIALEXPRESSION: 0
BREATHING: 0
BODYLANGUAGE: 0
TOTALSCORE: 1
NEGVOCALIZATION: 1
TOTALSCORE: 0
BODYLANGUAGE: 0
BREATHING: 0
CONSOLABILITY: 0

## 2020-01-01 ASSESSMENT — PAIN SCALES - GENERAL
PAINLEVEL_OUTOF10: 0
PAINLEVEL_OUTOF10: 3
PAINLEVEL_OUTOF10: 0

## 2020-01-01 ASSESSMENT — ENCOUNTER SYMPTOMS
COUGH: 0
SHORTNESS OF BREATH: 1
VOMITING: 0
WHEEZING: 0
SHORTNESS OF BREATH: 1
CHEST TIGHTNESS: 0
SHORTNESS OF BREATH: 0
TROUBLE SWALLOWING: 0
COUGH: 1
ABDOMINAL PAIN: 0
RHINORRHEA: 0
NAUSEA: 0
BACK PAIN: 0
CONSTIPATION: 0
RHINORRHEA: 1
SORE THROAT: 0
COUGH: 0
DIARRHEA: 0

## 2020-01-01 ASSESSMENT — PAIN DESCRIPTION - LOCATION
LOCATION: ABDOMEN
LOCATION: ELBOW

## 2020-01-01 ASSESSMENT — PAIN DESCRIPTION - ORIENTATION: ORIENTATION: RIGHT

## 2020-01-01 ASSESSMENT — PAIN - FUNCTIONAL ASSESSMENT: PAIN_FUNCTIONAL_ASSESSMENT: PREVENTS OR INTERFERES SOME ACTIVE ACTIVITIES AND ADLS

## 2020-01-01 ASSESSMENT — PULMONARY FUNCTION TESTS
FEV1_PERCENT_PREDICTED_POST: 47
FEV1/FVC_PRE: 48
FEV1/FVC_POST: 50
FEV1_PERCENT_PREDICTED_PRE: 42

## 2020-01-01 ASSESSMENT — PAIN DESCRIPTION - PAIN TYPE
TYPE: ACUTE PAIN
TYPE: ACUTE PAIN

## 2020-02-10 NOTE — PROCEDURES
[address] [place] [address] Pulmonary Function Lab - Six Minute Walk      Test Performed on:   Room Air__X__   Oxygen at _____ lpm via N/C- continuous Oxygen at _____ lpm via N/C- OCD  Assist Device Used During Test:  None______ Cane__X___ Walker_________    Modified Ashly's Scale  0 Nothing at all 5 Strong    0.5 Extremely Weak 6 Stronger (Hard)    1 Very weak 7 Very Strong   2 Weak (light) 8 Very Very Strong   3 Moderate 9 Extremely Strong   4 Somewhat Strong 10 Maximum All out      Time SpO2 Heart Rate Respiratory Rate Dyspnea-  Modified Ashlys Scale Fatigue- Modified Ashlys Scale Other Symptoms   Baseline                     96% 90 16 0 0      1 minute                     94% 119 20 0 0    2 minutes                     93% 126 22 1 0      3 minutes                     93% 128 24 2 1    4 minutes                     93% 131 24 2 2    5 minutes                     93% 139 24 2 2    6 minutes                     94% 133 24 3 2    Recovery x 1 minute                     95% 120 20 1 1    Recovery x 2 Minute                     96% 119 20 1 0     Number of Laps__6____ X 100 feet + _________ additional feet = Total Distance __600___feet   Stopped or paused before 6 minutes? No__X___ Yes ________  Total expected 6 MW distance is ___1765__feet. Patient achieved __45__% of expected distance.    Pre Blood Pressure:104/72  Post Blood Pressure:137/91  Other symptoms at the end of exercise:

## 2020-02-11 NOTE — PROGRESS NOTES
Pulmonary Outpatient Note   Annabella Marsh MD       2/11/2020    1. Centrilobular emphysema ([address]    2. Panlobular emphysema ([address]    3. Bronchiectasis without complication (HCC)    4. Lung nodule    5. Abnormal CT scan, chest    6. Asbestos exposure          ASSESSMENT/PLAN:  Centrilobular and panlobular emphysema. The patient has a strong family history, has basilar bullous disease. Alpha-1 antitrypsin level was normal.  Not much change in his PFT given the interval between the 2 tests. They were given a copy of a PFT. Bronchiectasis, granulomatous lung disease. Bronchiectasis appears mild. Minimally symptomatic. Lung nodule. No change of concern, results discussed with the patient and his wife. Asbestos exposure. No clear-cut evidence of pleural plaques or pulmonary fibrosis. This is a risk factor for neoplasm. Prophylaxis. He was administered a Pneumovax booster. He has received Prevnar, does not take flu shots. RTC in 6 months to a year. Orders Placed This Encounter   Procedures    Pneumococcal polysaccharide vaccine 23-valent greater than or equal to 1yo subcutaneous/IM       No follow-ups on file. Chief Complaint:   Follow-up (PFT/CT)       HPI: Roselyn Sabillon is a very pleasant [de-identified]y.o. year old male here for follow-up for COPD. He is accompanied by his wife Gabriela Adrian and his daughter-in-law Lyndsey Callahan. Mrs. Delfina Gardner presents for follow-up, was seen on 12/16/2019. He completed a PFT, 6-minute walk test and CT chest.  The patient does cut grass on a mower, but otherwise does not walk much. He says his hands are very cold in the winter, does not notice any discoloration. There is no other change in his medical or surgical history, medications. PFT 2/10/2020  FVC 2.5 L, 62% predicted, FEV1 1.2 L, 42% predicted, with a ratio 48%. There was no response to bronchodilator. Lung volumes showed air trapping. Diffusion capacity 32% predicted.   When compared to the prior study is warm and dry. Coloration: Skin is not jaundiced or pale. Findings: No bruising, erythema, lesion or rash. Comments: Few ecchymoses   Neurological:      General: No focal deficit present. Mental Status: He is alert and oriented to person, place, and time. Comments: Reduced vision, detailed neurologic exam not performed   Psychiatric:         Mood and Affect: Mood normal.         Behavior: Behavior normal.         Thought Content:  Thought content normal.         Judgment: Judgment normal.

## 2020-02-12 NOTE — PROCEDURES
[address] [address]                               PULMONARY FUNCTION    PATIENT NAME: Chelsey Rossi                  :        1940  MED REC NO:   [id number]                          ROOM:  ACCOUNT NO:   [de-identified]                           ADMIT DATE: 02/10/2020  PROVIDER:     Janene Field MD    DATE OF PROCEDURE:  02/10/2020    PFT    On the PFT, FVC 2.5 L, 62% predicted, FEV1 1.2 L, 42% predicted, with  FEV1/FVC ratio of 48%. There was no response to bronchodilator. Lung  volumes showed air trapping. Diffusion capacity was 72% predicted. IMPRESSION:  Severe obstructive defect compatible with COPD with no  response to bronchodilator. When compared to the prior study from  2008, there has been a mild decrease in spirometric indices,  greater degree of air trapping. Diffusion capacity remained about the  same. Clinical correlation is recommended. SIX-MINUTE WALK TEST    A six-minute walk test was performed utilizing standard criteria on  02/10/2020. Baseline oxygen saturation was 96%, Ashly scales were 0. The patient walked a total distance of 600 feet, 45% predicted. There  was no desaturation below 93%. Heart rate peaked to 139 per minute,  respiratory rate peaked at 24. Ashly dyspnea scale is peaked at 3,  fatigue scale peaked at 2. IMPRESSION:  Reduced six-minute walk distance without desaturation  enough to qualify for supplemental oxygen. Clinical correlation is  recommended.         Rico Mares MD    D: 2020 10:25:44       T: 2020 10:45:33     [email or web address]  Job#: [id number]     Doc#: [id number]    CC:  Heidi Chu DO

## 2020-07-06 PROBLEM — I48.91 ATRIAL FIBRILLATION WITH RVR (HCC): Status: ACTIVE | Noted: 2020-01-01

## 2020-07-06 NOTE — ED NOTES
Patient resting in bed with eyes closed. Wife remains at bedside. No current needs. Call light within reach.      Reny Lee RN  07/06/20 8576

## 2020-07-06 NOTE — DISCHARGE INSTR - COC
Delivery:[id number]}    Wound Care Documentation and Therapy:        Elimination:  Continence:   · Bowel: {YES / RY:29244}  · Bladder: {YES / ZN:02905}  Urinary Catheter: {Urinary Catheter:900552152}   Colostomy/Ileostomy/Ileal Conduit: {YES / UY:98172}       Date of Last BM: ***  No intake or output data in the 24 hours ending 20 1307  No intake/output data recorded.     Safety Concerns:     [address] [employer] Safety Concerns:367725062}    Impairments/Disabilities:      [employer] Impairments/Disabilities:994081193}    Nutrition Therapy:  Current Nutrition Therapy:   [employer] Diet List:730705190}    Routes of Feeding: {CHP DME Other Feedings:086446547}  Liquids: {Slp liquid thickness:39884}  Daily Fluid Restriction: {CHP DME Yes amt example:219222651}  Last Modified Barium Swallow with Video (Video Swallowing Test): {Done Not Done BUME:485637237}    Treatments at the Time of Hospital Discharge:   Respiratory Treatments: ***  Oxygen Therapy:  {Therapy; copd oxygen:09944}  Ventilator:    { CC Vent LJJV:065442943}    Rehab Therapies: {THERAPEUTIC INTERVENTION:4897628013}  Weight Bearing Status/Restrictions: [facility] Weight Bearin}  Other Medical Equipment (for information only, NOT a DME order):  {EQUIPMENT:683754870}  Other Treatments: ***    Patient's personal belongings (please select all that are sent with patient):  {[facility] DME Belongings:911362705}    RN SIGNATURE:  {Esignature:853984217}    CASE MANAGEMENT/SOCIAL WORK SECTION    Inpatient Status Date: ***    Readmission Risk Assessment Score:  Readmission Risk              Risk of Unplanned Readmission:        0           Discharging to Facility/ Agency   · Name:   · Address:  · Phone:  · Fax:    Dialysis Facility (if applicable)   · Name:  · Address:  · Dialysis Schedule:  · Phone:  · Fax:    / signature: {Esignature:528554401}    PHYSICIAN SECTION    Prognosis: Poor    Condition at Discharge: Terminal    Rehab Potential (if transferring to Rehab): Poor    Recommended Labs or Other Treatments After Discharge: hospice    Physician Certification: I certify the above information and transfer of Dorothea Gaytan  is necessary for the continuing treatment of the diagnosis listed and that he requires Hospice for less 30 days.      Update Admission H&P: No change in H&P    PHYSICIAN SIGNATURE:  Electronically signed by Nick Perez MD on 7/11/20 at 1:06 PM EDT

## 2020-07-06 NOTE — ED PROVIDER NOTES
[facility]  ED  EMERGENCYDEPARTMENT ENCOUNTER      Pt Name: Heather Novoa  MRN: [id number]  Sushantgfpalma 1940  Date of evaluation: 7/6/2020  Crissy Lebron MD    CHIEF COMPLAINT       Chief Complaint   Patient presents with    Fatigue     hgx afib . . has been weak for last week or so. Carmenflo Cabrera afib rvr. ...in 150's tried vagal  with slight response         HISTORY OF PRESENT ILLNESS   (Location/Symptom, Timing/Onset,Context/Setting, Quality, Duration, Modifying Factors, Severity)  Note limiting factors.   -Heather Novoa is a 79 y.o. male with history of CAD, emphysema presents to ED for rapid heart rate. Patient reports shortness of breath for several years, however worsened for the last 6 months. Reports palpitations for 3-4 years. Denies cp, fever, n/v, lightheaded/dizzy. States he has a history of afib, is unsure why he is not on any medications. Wife, Darian Travis states patient stopped going to the rest of doctors and only sees his pcp. Wife states his 'heart is acting up' ranging from 40s-140s, and has been refusing to go to the hospital until today. Also reports progressive confusion  HPI    Nursing Notes were reviewed. REVIEW OF SYSTEMS    (2-9 systems for level 4, 10 or more for level 5)     Review of Systems   Constitutional: Negative for activity change, appetite change, chills, diaphoresis and fever. HENT: Negative for congestion, rhinorrhea, sneezing, sore throat and trouble swallowing. Respiratory: Positive for shortness of breath. Negative for cough and chest tightness. Cardiovascular: Positive for palpitations. Negative for chest pain. Gastrointestinal: Negative for abdominal pain, constipation, diarrhea, nausea and vomiting. Genitourinary: Negative for dysuria, flank pain and hematuria. Musculoskeletal: Negative for back pain, gait problem and myalgias. Skin: Negative for rash and wound. Neurological: Negative for dizziness, weakness and numbness. Psychiatric/Behavioral: Positive for confusion. Except as noted above the remainder of the review of systems was reviewedand negative. PAST MEDICAL HISTORY     Past Medical History:   Diagnosis Date    CAD (coronary artery disease)     Emphysema of lung ([address]          SURGICAL HISTORY       Past Surgical History:   Procedure Laterality Date    CATARACT REMOVAL      HERNIA REPAIR           CURRENT MEDICATIONS       Previous Medications    LEVOTHYROXINE (SYNTHROID) 50 MCG TABLET        SPIRIVA HANDIHALER 18 MCG INHALATION CAPSULE        SYMBICORT 160-4.5 MCG/ACT AERO           ALLERGIES     Patient has no known allergies.     FAMILY HISTORY       Family History   Problem Relation Age of Onset    Heart Failure Mother     Macular Degen Mother     COPD Brother     Hypertension Son           SOCIAL HISTORY       Social History     Socioeconomic History    Marital status:      Spouse name: None    Number of children: None    Years of education: None    Highest education level: None   Occupational History    None   Social Needs    Financial resource strain: None    Food insecurity     Worry: None     Inability: None    Transportation needs     Medical: None     Non-medical: None   Tobacco Use    Smoking status: Never Smoker    Smokeless tobacco: Never Used   Substance and Sexual Activity    Alcohol use: Not Currently    Drug use: Never    Sexual activity: Not Currently   Lifestyle    Physical activity     Days per week: None     Minutes per session: None    Stress: None   Relationships    Social connections     Talks on phone: None     Gets together: None     Attends [employer] service: None     Active member of club or organization: None     Attends meetings of clubs or organizations: None     Relationship status: None    Intimate partner violence     Fear of current or ex partner: None     Emotionally abused: None     Physically abused: None     Forced sexual activity: None Other Topics Concern    None   Social History Narrative    None       SCREENINGS    Walter Coma Scale  Eye Opening: Spontaneous  Best Verbal Response: Oriented  Best Motor Response: Obeys commands  Richmond Coma Scale Score: 15        PHYSICAL EXAM    (up to 7 for level 4, 8 ormore for level 5)     ED Triage Vitals   BP Temp Temp Source Pulse Resp SpO2 Height Weight   07/06/20 1101 07/06/20 1101 07/06/20 1101 07/06/20 1101 07/06/20 1059 07/06/20 1101 07/06/20 1059 07/06/20 1059   (!) 110/93 97.4 °F (36.3 °C) Oral 156 20 96 % 5' 11\" (1.803 m) 130 lb (59 kg)       Physical Exam  Constitutional:       General: He is not in acute distress. Appearance: Normal appearance. He is well-developed. He is not ill-appearing or toxic-appearing. Comments: Sitting in bed comfortably, speaking in full sentences, following verbal commands appropriately. Not in acute distress     HENT:      Head: Normocephalic and atraumatic. Eyes:      Conjunctiva/sclera: Conjunctivae normal.      Pupils: Pupils are equal, round, and reactive to light. Neck:      Musculoskeletal: Normal range of motion and neck supple. Cardiovascular:      Rate and Rhythm: Tachycardia present. Rhythm irregularly irregular. Heart sounds: Normal heart sounds. No murmur. No friction rub. No gallop. Pulmonary:      Effort: Pulmonary effort is normal. No respiratory distress. Breath sounds: Normal breath sounds. No decreased breath sounds, wheezing, rhonchi or rales. Abdominal:      General: Bowel sounds are normal. There is no distension. Palpations: Abdomen is soft. Tenderness: There is no abdominal tenderness. There is no guarding or rebound. Musculoskeletal: Normal range of motion. General: No tenderness or deformity. Skin:     General: Skin is warm and dry. Findings: No rash. Neurological:      Mental Status: He is alert and oriented to person, place, and time. GCS: GCS eye subscore is 4.  GCS verbal subscore is 5. GCS motor subscore is 6. Psychiatric:         Behavior: Behavior is cooperative. DIAGNOSTIC RESULTS     EKG: All EKG's are interpreted by the Emergency Department Physicianwho either signs or Co-signs this chart in the absence of a cardiologist.    -The Ekg interpreted by me shows  sinus tachycardia, ajcm=388 wide qrs  Axis is   normal  QTc is  515  Intervals and Durations are unremarkable.       ST Segments: nonspecific changes  No prior ekg    RADIOLOGY:   Non-plain film images such as CT, Ultrasound and MRI are read by the radiologist. Plain radiographic images are visualized and preliminarily interpreted by the emergency physician with the below findings:      Interpretation per the Radiologist below, if available at the time of this note:    XR CHEST PORTABLE   Final Result   Pulmonary venous hypertension with low left larger than right pleural   effusions and bibasilar atelectasis               ED BEDSIDE ULTRASOUND:   Performed by ED Physician - none    LABS:  Labs Reviewed   CBC WITH AUTO DIFFERENTIAL - Abnormal; Notable for the following components:       Result Value    WBC 12.4 (*)     Hemoglobin 17.9 (*)     Hematocrit 53.0 (*)     Platelets 64 (*)     MPV 12.7 (*)     Neutrophils Absolute 10.4 (*)     Lymphocytes Absolute 0.8 (*)     All other components within normal limits    Narrative:     CALL  Claudio Bee [phone],  Rejected Test Name/Called to:CMP TROP BNPEP/Milly White RN, 07/06/2020 11:24,  by [facility]  Performed at:  [address],  [address] [place] [address], [address] [place] [address]   Phone [phone]   COMPREHENSIVE METABOLIC PANEL - Abnormal; Notable for the following components:    Sodium 131 (*)     Chloride 95 (*)     Glucose 172 (*)     BUN 22 (*)     GFR Non- 53 (*)     Total Protein 6.1 (*)     Alb 3.2 (*)     Total Bilirubin 2.1 (*)     ALT 67 (*)     AST 82 (*)     All other components within normal limits    Narrative: Performed at:  [facility]  [address],  [place], [address]   Phone [phone]   TROPONIN - Abnormal; Notable for the following components:    Troponin 0.04 (*)     All other components within normal limits    Narrative:     Performed at:  [facility]  [address],  [place], [address]   Phone [phone]    Narrative:     Mariela Hernández [phone],  Rejected Test Name/Called to:CMP TROP BNPEP/Milly Meade, 07/06/2020 11:24,  by [facility]  Performed at:  [address],  [place], [address] [employer]   Phone [phone]   URINALYSIS       All other labs were within normal range ornot returned as of this dictation. EMERGENCY DEPARTMENT COURSE and DIFFERENTIAL DIAGNOSIS/MDM:   Vitals:    Vitals:    07/06/20 1501 07/06/20 1503 07/06/20 1535 07/06/20 1611   BP: 104/76 99/87 102/84 (!) 105/57   Pulse: 102 59 98 105   Resp: 20 22 20 19   Temp:       TempSrc:       SpO2: 97% 95% 97% 96%   Weight:       Height:             MDM    ED COURSE/MDM    -Bernie Kramer is a 79 y.o. male with a history of self-reported A. fib, CAD, emphysema who presents ED for palpitations, shortness of breath and progressive confusion. On arrival patient was tachycardic in the 150s, EKG demonstrates wide QRS. Was initially given 1 L IV fluid bolus which improved the heart rate down to 110s 120s. Was then given 10 mg bolus of diltiazem which improved that further to the 90s. Repeat EKG shows an irregularly irregular rhythm.  -Lab work was significant for hype bio natremia of 131, elevated troponin 0 0.04 with mild transaminitis. X-ray shows pulmonary venous hypertension with low left larger than right pleural effusions and bibasilar atelectasis.  -Labs and imaging reviewed and results discussed with patient.   Given patient's abnormal EKGs, elevated troponin with no comparison and self-reported history of noncompliance with wife stating that patient has declined seeing any of his physicians for a long time, feel that patient would warrant inpatient work-up and treatment for likely undiagnosed/untreated comorbidities. REASSESSMENT      Well appearing, non toxic, alert, oriented speaking in full sentences and hemodynamically stable upon discharge      CRITICAL CARE TIME   Total Critical Care time was 25 minutes, excluding separately reportableprocedures. There was a high probability of clinicallysignificant/life threatening deterioration in the patient's condition which required my urgent intervention. CONSULTS:  IP CONSULT TO HOSPITALIST    PROCEDURES:  Unless otherwise noted below, none     Procedures    FINAL IMPRESSION      1. Tachycardia    2.  Elevated troponin          DISPOSITION/PLAN   DISPOSITION Admitted 07/06/2020 02:34:38 PM      PATIENT REFERREDTO:  DO Anisha Truong 44  [phone]            DISCHARGE MEDICATIONS:  New Prescriptions    No medications on file          (Please note that portions of this note were completed with a voice recognition program.  Efforts were made to edit the dictations but occasionally wordsare mis-transcribed.)    Miguel Angel Emery MD (electronically signed)  Attending Emergency Physician              Miguel Angel Emery MD  07/06/20 0256

## 2020-07-06 NOTE — ED NOTES
Patients HR remains in the upper 150's. Dr. Duane Ramming aware.      Therese Awad, WILDER  07/06/20 6162

## 2020-07-06 NOTE — ED NOTES
Medication list completed with patient and wife. All information believed to be true and accurate.      Maria Brown RN  07/06/20 1071

## 2020-07-06 NOTE — ED NOTES
Bed: 16  Expected date:   Expected time:   Means of arrival:   Comments:  anabell Soto RN  07/06/20 3205

## 2020-07-06 NOTE — H&P
Reviewed in detail and negative for DM, CAD, Cancer, CVA. Positive as follows:        Problem Relation Age of Onset    Heart Failure Mother     Macular Degen Mother     COPD Brother     Hypertension Son        REVIEW OF SYSTEMS:   Pertinent positives as noted in the HPI. All other systems reviewed and negative. PHYSICAL EXAM PERFORMED:    /84   Pulse 98   Temp 97.4 °F (36.3 °C) (Oral)   Resp 20   Ht 5' 11\" (1.803 m)   Wt 130 lb (59 kg)   SpO2 97%   BMI 18.13 kg/m²     General appearance:  No apparent distress, appears stated age and cooperative. HEENT:  Normal cephalic, atraumatic without obvious deformity. Pupils equal, round, and reactive to light. Extra ocular muscles intact. Conjunctivae/corneas clear. Neck: Supple, with full range of motion. No jugular venous distention. Trachea midline. Respiratory:  Normal respiratory effort. Clear to auscultation, bilaterally without Rales/Wheezes/Rhonchi. Cardiovascular:  Tachycardic, irregular rhythm with normal S1/S2 without murmurs, rubs or gallops. Abdomen: Soft, non-tender, non-distended with normal bowel sounds. Musculoskeletal:  No clubbing, cyanosis or edema bilaterally. Full range of motion without deformity. Skin: Skin color, texture, turgor normal.  No rashes or lesions. Neurologic:  Neurovascularly intact without any focal sensory/motor deficits. Cranial nerves: II-XII intact, grossly non-focal.  Psychiatric:  Alert and oriented, thought content appropriate, normal insight  Capillary Refill: Brisk,< 3 seconds   Peripheral Pulses: +2 palpable, equal bilaterally       Labs:     Recent Labs     07/06/20  1112   WBC 12.4*   HGB 17.9*   HCT 53.0*   PLT 64*     Recent Labs     07/06/20  1229   *   K 4.7   CL 95*   CO2 21   BUN 22*   CREATININE 1.3   CALCIUM 9.1     Recent Labs     07/06/20  1229   AST 82*   ALT 67*   BILITOT 2.1*   ALKPHOS 99     No results for input(s): INR in the last 72 hours.   Recent Labs     07/06/20  1229 TROPONINI 0.04*       Urinalysis:    No results found for: Oralee Butter, [place] Professor Se[address], [facility], [place], [address], [place] [facility] [address]    Radiology:     CXR: I have reviewed the CXR with the following interpretation: bilateral pleural effusions  EKG:  I have reviewed the EKG with the following interpretation: afib, RBBB    XR CHEST PORTABLE   Final Result   Pulmonary venous hypertension with low left larger than right pleural   effusions and bibasilar atelectasis             ASSESSMENT:    Active Hospital Problems    Diagnosis Date Noted    Atrial fibrillation with RVR ([facility] [I48.91] 07/06/2020         PLAN:  Afib with RVR  - prior history but not currently on any treatment  - improved with IV cardizem  - started PO cardizem q6h  - starting eliquis for AC  - Hgb A1c, TSH ordered  - echo ordered  - tele    Hypothyroidism  - TSH ordered  - continue home synthroid    Elevated LFTs  - unclear etiology  - Viral hepatitis panel ordered  - will need further work up as an outpatient    COPD  - no evidence of exacerbation  - continue home inhalers    DVT Prophylaxis: lovenox  Diet: Regular diet  Code Status: Full code    PT/OT Eval Status: not ordered    Dispo - 1-2 days       Amado Vasquez MD    Thank you Tal Fletcher DO for the opportunity to be involved in this patient's care. If you have any questions or concerns please feel free to contact me at [phone].

## 2020-07-06 NOTE — ED NOTES
[facility] @ 1411  RE: admission per DARRON [address] Dr. called back @ [phone] [facility]  07/06/20 3447

## 2020-07-06 NOTE — ED NOTES
Lab called, stating CMP is hemolyzed for a second time.  in route to ED to attempt blood draw.      Jamee Morris RN  07/06/20 4566

## 2020-07-06 NOTE — ED PROVIDER NOTES
[facility] Emergency Department    CHIEF COMPLAINT  Fatigue (hgx afib . . has been weak for last week or so. Sunil Tesfaye afib rvr. ...in 150's tried vagal  with slight response)      HISTORY OF PRESENT ILLNESS  Myriam Sim is a 79 y.o. male who presents to the ED complaining + of no change in his chronic fatigue. He states that it is no worse but \"is not any better\". Patient states that he has not seen his primary care doctor in over 1 year. HPI is limited due to the patient's history of dementia. He is unaccompanied at this time but states that his wife will likely be coming into the emergency department. No other complaints, modifying factors or associated symptoms. Nursing notes reviewed.    Past Medical History:   Diagnosis Date    CAD (coronary artery disease)     Emphysema of lung ([address]      Past Surgical History:   Procedure Laterality Date    CATARACT REMOVAL      HERNIA REPAIR       Family History   Problem Relation Age of Onset    Heart Failure Mother     Macular Degen Mother     COPD Brother     Hypertension Son      Social History     Socioeconomic History    Marital status:      Spouse name: Not on file    Number of children: Not on file    Years of education: Not on file    Highest education level: Not on file   Occupational History    Not on file   Social Needs    Financial resource strain: Not on file    Food insecurity     Worry: Not on file     Inability: Not on file   [employer] needs     Medical: Not on file     Non-medical: Not on file   Tobacco Use    Smoking status: Never Smoker    Smokeless tobacco: Never Used   Substance and Sexual Activity    Alcohol use: Not Currently    Drug use: Never    Sexual activity: Not Currently   Lifestyle    Physical activity     Days per week: Not on file     Minutes per session: Not on file    Stress: Not on file   Relationships    Social connections     Talks on phone: Not on file     Gets together: Not on file     Attends Quaker service: Not on file     Active member of club or organization: Not on file     Attends meetings of clubs or organizations: Not on file     Relationship status: Not on file    Intimate partner violence     Fear of current or ex partner: Not on file     Emotionally abused: Not on file     Physically abused: Not on file     Forced sexual activity: Not on file   Other Topics Concern    Not on file   Social History Narrative    Not on file     Current Facility-Administered Medications   Medication Dose Route Frequency Provider Last Rate Last Dose    0.9 % sodium chloride infusion  1,000 mL Intravenous Continuous TAYO Yun - CNP 1,000 mL/hr at 07/06/20 1932 1,000 mL at 07/06/20 1932    tiotropium (SPIRIVA RESPIMAT) 2.5 MCG/ACT inhaler 2 puff  2 puff Inhalation Daily Evette Polanco MD        budesonide-formoterol (SYMBICORT) 160-4.5 MCG/ACT inhaler 2 puff  2 puff Inhalation BID Evette Polanco MD        sodium chloride flush 0.9 % injection 10 mL  10 mL Intravenous 2 times per day Evette Polanco MD        sodium chloride flush 0.9 % injection 10 mL  10 mL Intravenous PRN Evette Polanco MD        acetaminophen (TYLENOL) tablet 650 mg  650 mg Oral Q6H PRN Evette Polanco MD        Or    acetaminophen (TYLENOL) suppository 650 mg  650 mg Rectal Q6H PRN Evette Polanco MD        polyethylene glycol [facility]) packet 17 g  17 g Oral Daily PRN Evette Polanco MD        promethazine (PHENERGAN) tablet 12.5 mg  12.5 mg Oral Q6H PRN Evette Polanco MD        Or    ondansetron [facility]) injection 4 mg  4 mg Intravenous Q6H PRN Evette Polanco MD        perflutren lipid microspheres (DEFINITY) injection 1.65 mg  1.5 mL Intravenous ONCE PRN Evette Polanco MD        dilTIAZem (CARDIZEM) tablet 30 mg  30 mg Oral 4 times per day Evette Polanco MD   30 mg at 07/06/20 1902    apixaban (ELIQUIS) tablet 2.5 mg  2.5 mg Oral BID Evette Polanco MD         No Known Allergies    REVIEW OF SYSTEMS  10 systems reviewed, pertinent positives per HPI otherwise noted to be negative    PHYSICAL EXAM  BP (!) 122/91   Pulse 140   Temp 97.8 °F (36.6 °C) (Oral)   Resp 20   Ht 5' 11\" (1.803 m)   Wt 130 lb (59 kg)   SpO2 96%   BMI 18.13 kg/m²   GENERAL APPEARANCE: Awake and alert. Cooperative. No acute distress. HEAD: Normocephalic. Atraumatic. EYES: PERRL. EOM's grossly intact. ENT: Mucous membranes are moist and intact. NECK: Supple. HEART: A fib with RVR rate in 140s-150s. No murmurs. LUNGS: Respirations unlabored. CTAB. Good air exchange. Speaking comfortably in full sentences. ABDOMEN: Soft. Non-distended. Non-tender. No guarding or rebound. No masses. No organomegaly. EXTREMITIES: No peripheral edema. Moves all extremities equally. All extremities neurovascularly intact. SKIN: Warm and dry. No acute rashes. NEUROLOGICAL: Alert and oriented. CN's 2-12 intact. No gross facial drooping. Strength 5/5, sensation intact. PSYCHIATRIC: Normal mood and affect. RADIOLOGY  Xr Chest Portable    Result Date: 7/6/2020  EXAMINATION: ONE XRAY VIEW OF THE CHEST 7/6/2020 11:08 am COMPARISON: Chest CT 02/03/2020 HISTORY: ORDERING SYSTEM PROVIDED HISTORY: weak a-fib TECHNOLOGIST PROVIDED HISTORY: Reason for exam:->weak a-fib Reason for Exam: weak a-fib Acuity: Acute Type of Exam: Initial FINDINGS: Cardiomegaly. Prominent upper lobe pulmonary vessels. Left larger than right pleural effusions with strandy bibasilar airspace disease. Pulmonary venous hypertension with low left larger than right pleural effusions and bibasilar atelectasis         ED COURSE   I have evaluated this patient in collaboration with Dr. Adrian Benavidez. troponin 0.04. XR consistent with pulmonary venous hypertension, pleural effusion, and bibasilar atelectasis. And was given diltiazem 10 mg IV bolus for tachycardia 140s-150s beats per minute with improvement to low 100s, presently 102.     A Result Value Ref Range    Troponin 0.04 (H) <0.01 ng/mL   EKG 12 Lead   Result Value Ref Range    Ventricular Rate 156 BPM    Atrial Rate 153 BPM    QRS Duration 124 ms    Q-T Interval 320 ms    QTc Calculation (Bazett) 515 ms    R Axis 263 degrees    T Axis 72 degrees    Diagnosis       Wide QRS tachycardia with occasional Premature ventricular complexes and Fusion complexesRight bundle branch blockInferior infarct , age undeterminedAnterolateral infarct , age undeterminedAbnormal ECGWhen compared with ECG of 06-NOV-2009 10:30,Wide QRS tachycardia has replaced Sinus rhythmVent. rate has increased BY  93 BPM   EKG 12 Lead   Result Value Ref Range    Ventricular Rate 95 BPM    Atrial Rate 95 BPM    QRS Duration 126 ms    Q-T Interval 442 ms    QTc Calculation (Bazett) 555 ms    R Axis 263 degrees    T Axis 72 degrees    Diagnosis       Undetermined rhythmRight bundle branch blockInferior infarct , age undeterminedAnterior infarct , age undeterminedAbnormal ECGWhen compared with ECG of 06-JUL-2020 11:04,Current undetermined rhythm precludes rhythm comparison, needs review           Final Impression    1. Tachycardia    2. Elevated troponin        Blood pressure (!) 122/91, pulse 140, temperature 97.8 °F (36.6 °C), temperature source Oral, resp. rate 20, height 5' 11\" (1.803 m), weight 130 lb (59 kg), SpO2 96 %.        Results for orders placed or performed during the hospital encounter of 07/06/20   CBC Auto Differential   Result Value Ref Range    WBC 12.4 (H) 4.0 - 11.0 K/uL    RBC 5.52 4.20 - 5.90 M/uL    Hemoglobin 17.9 (H) 13.5 - 17.5 g/dL    Hematocrit 53.0 (H) 40.5 - 52.5 %    MCV 96.1 80.0 - 100.0 fL    MCH 32.4 26.0 - 34.0 pg    MCHC 33.7 31.0 - 36.0 g/dL    RDW 14.7 12.4 - 15.4 %    Platelets 64 (L) 411 - 450 K/uL    MPV 12.7 (H) 5.0 - 10.5 fL    PLATELET SLIDE REVIEW Decreased     SLIDE REVIEW see below     Neutrophils % 84.1 %    Lymphocytes % 6.6 %    Monocytes % 8.8 %    Eosinophils % 0.2 %    Basophils % 0.3 %    Neutrophils Absolute 10.4 (H) 1.7 - 7.7 K/uL    Lymphocytes Absolute 0.8 (L) 1.0 - 5.1 K/uL    Monocytes Absolute 1.1 0.0 - 1.3 K/uL    Eosinophils Absolute 0.0 0.0 - 0.6 K/uL    Basophils Absolute 0.0 0.0 - 0.2 K/uL   SPECIMEN REJECTION   Result Value Ref Range    Rejected Test CMP TROP BNPEP     Reason for Rejection see below    Comprehensive Metabolic Panel   Result Value Ref Range    Sodium 131 (L) 136 - 145 mmol/L    Potassium 4.7 3.5 - 5.1 mmol/L    Chloride 95 (L) 99 - 110 mmol/L    CO2 21 21 - 32 mmol/L    Anion Gap 15 3 - 16    Glucose 172 (H) 70 - 99 mg/dL    BUN 22 (H) 7 - 20 mg/dL    CREATININE 1.3 0.8 - 1.3 mg/dL    GFR Non- 53 (A) >60    GFR African American >60 >60    Calcium 9.1 8.3 - 10.6 mg/dL    Total Protein 6.1 (L) 6.4 - 8.2 g/dL    Alb 3.2 (L) 3.4 - 5.0 g/dL    Albumin/Globulin Ratio 1.1 1.1 - 2.2    Total Bilirubin 2.1 (H) 0.0 - 1.0 mg/dL    Alkaline Phosphatase 99 40 - 129 U/L    ALT 67 (H) 10 - 40 U/L    AST 82 (H) 15 - 37 U/L    Globulin 2.9 g/dL   Troponin   Result Value Ref Range    Troponin 0.04 (H) <0.01 ng/mL   EKG 12 Lead   Result Value Ref Range    Ventricular Rate 156 BPM    Atrial Rate 153 BPM    QRS Duration 124 ms    Q-T Interval 320 ms    QTc Calculation (Bazett) 515 ms    R Axis 263 degrees    T Axis 72 degrees    Diagnosis       Wide QRS tachycardia with occasional Premature ventricular complexes and Fusion complexesRight bundle branch blockInferior infarct , age undeterminedAnterolateral infarct , age undeterminedAbnormal ECGWhen compared with ECG of 06-NOV-2009 10:30,Wide QRS tachycardia has replaced Sinus rhythmVent.  rate has increased BY  93 BPM   EKG 12 Lead   Result Value Ref Range    Ventricular Rate 95 BPM    Atrial Rate 95 BPM    QRS Duration 126 ms    Q-T Interval 442 ms    QTc Calculation (Bazett) 555 ms    R Axis 263 degrees    T Axis 72 degrees    Diagnosis       Undetermined rhythmRight bundle branch blockInferior infarct , age undeterminedAnterior infarct , age undeterminedAbnormal ECGWhen compared with ECG of 06-JUL-2020 11:04,Current undetermined rhythm precludes rhythm comparison, needs review       I spoke with Dr. Brenda White and Dr. Riki Earl. We thoroughly discussed the history, physical exam, laboratory and imaging studies, as well as, emergency department course. Based upon that discussion, we've decided to admit Isidro Austin to hospital for further evaluation, observation and treatment. Final Impression    1. Tachycardia    2. Elevated troponin        Blood pressure (!) 122/91, pulse 140, temperature 97.8 °F (36.6 °C), temperature source Oral, resp. rate 20, height 5' 11\" (1.803 m), weight 130 lb (59 kg), SpO2 96 %. DISPOSITION  Patient admitted to hospital in stable condition.           Kermit Baez, APRN - CNP  07/06/20 4750 [facility], APRN - CNP  07/06/20 Sonali Elizalde, APRN - CNP  07/11/20 2039 [facility] Jayda Carias, APRN - CNP  07/15/20 0157

## 2020-07-06 NOTE — ED NOTES

## 2020-07-06 NOTE — PROGRESS NOTES
Pt admitted from ED to [address] [place] [address]. Alert to person and time. Pt is in A fib with HR in 140s. Cardizem PO and IV administered per orders. Pt oriented to room and asked to call for assistance. Verbalizes understanding. Wife provided assistance with admission questions. Pt denies needs at this time. Call light within reach, bed in lowest position, wheels locked. Bed alarm on and audible. Will monitor.

## 2020-07-06 NOTE — PROGRESS NOTES
4 Eyes Skin Assessment     The patient is being assess for  Admission    I agree that 2 RN's have performed a thorough Head to Toe Skin Assessment on the patient. ALL assessment sites listed below have been assessed. Areas assessed by both nurses:   [x]   Head, Face, and Ears   [x]   Shoulders, Back, and Chest  [x]   Arms, Elbows, and Hands   [x]   Coccyx, Sacrum, and IschIum  [x]   Legs, Feet, and Heels        Does the Patient have Skin Breakdown?   No         Ez Prevention initiated:  Yes   Wound Care Orders initiated:  No      [facility] nurse consulted for Pressure Injury (Stage 3,4, Unstageable, DTI, NWPT, and Complex wounds), New and Established Ostomies:  No      Nurse 1 eSignature: Electronically signed by Rachel Vela RN on 7/6/20 at 7:52 PM EDT    **SHARE this note so that the co-signing nurse is able to place an eSignature**    Nurse 2 eSignature: Electronically signed by Hieu Hairston RN on 7/6/20 at 7:53 PM EDT

## 2020-07-06 NOTE — ED NOTES
Patient and wife updated at this time of delay in room assignment due to inpatient being full. Patient and wife verbalized understanding and state no current needs. Call light within reach.       Cyndi Nunez RN  07/06/20 8472

## 2020-07-07 NOTE — PLAN OF CARE
Problem: Nutrition  Goal: Optimal nutrition therapy  Outcome: Ongoing  Note: Nutrition Problem: Inadequate oral intake  Intervention: Food and/or Nutrient Delivery: Continue current diet, Start ONS  Nutritional Goals:  Tolerate diet and have po intakes 50% or greater this admission

## 2020-07-07 NOTE — CARE COORDINATION
CM attempted to reach pt via hospital room phone in attempted to complete initial assessment. No answer. CM attempted to reach spouse and son. No answer at spouse's home or cell. No answer on son's phone number either. No ability to leave voicemail.     Nora Perry RN CM

## 2020-07-07 NOTE — PROGRESS NOTES
Hospitalist Progress Note      PCP: Debby Ochoa DO    Date of Admission: 7/6/2020    Chief Complaint: fatigue     Hospital Course:  H & P reviewed. Pt admitted with A fib with RVR    Subjective:      Pt denied any chest pain, palpitations. Poor appetite at home with poor PO intake. Wife at bedside - on phone. Medications:  Reviewed    Infusion Medications    sodium chloride Stopped (07/06/20 6037)     Scheduled Medications    tiotropium  2 puff Inhalation Daily    budesonide-formoterol  2 puff Inhalation BID    sodium chloride flush  10 mL Intravenous 2 times per day    dilTIAZem  30 mg Oral 4 times per day    apixaban  2.5 mg Oral BID    levothyroxine  50 mcg Oral Daily     PRN Meds: sodium chloride flush, acetaminophen **OR** acetaminophen, polyethylene glycol, promethazine **OR** ondansetron, perflutren lipid microspheres      Intake/Output Summary (Last 24 hours) at 7/7/2020 1123  Last data filed at 7/7/2020 0753  Gross per 24 hour   Intake 2320 ml   Output 2 ml   Net 2318 ml       Physical Exam Performed:    BP 93/60   Pulse 72   Temp 97.6 °F (36.4 °C) (Oral)   Resp 18   Ht 5' 11\" (1.803 m)   Wt 131 lb 2.8 oz (59.5 kg)   SpO2 94%   BMI 18.30 kg/m²     General appearance: No apparent distress, appears stated age and cooperative. HEENT: Pupils equal, round,. Conjunctivae/corneas clear. Neck: Supple, with full range of motion. No jugular venous distention. Trachea midline. Respiratory:  Normal respiratory effort. Clear to auscultation, bilaterally without Rales/Wheezes/Rhonchi. Cardiovascular: irregular  rate and rhythm with normal S1/S2 without murmurs, rubs or gallops. Abdomen: Soft, non-tender, non-distended with normal bowel sounds. Musculoskeletal: No clubbing, cyanosis or edema bilaterally. Skin: warm and dry   Neurologic:  Neurovascularly intact without any focal sensory/motor deficits.  Cranial nerves: II-XII intact, grossly non-focal.  Psychiatric: Alert and oriented to self only, thought content inappropriate, limited insight      Labs:   Recent Labs     07/06/20  1112 07/07/20  0530   WBC 12.4* 10.0   HGB 17.9* 16.2   HCT 53.0* 48.0   PLT 64* 61*     Recent Labs     07/06/20  1229 07/07/20  0530   * 132*   K 4.7 4.7   CL 95* 98*   CO2 21 21   BUN 22* 27*   CREATININE 1.3 1.2   CALCIUM 9.1 8.4     Recent Labs     07/06/20  1229 07/07/20  0530   AST 82* 406*   ALT 67* 249*   BILIDIR  --  0.7*   BILITOT 2.1* 1.9*   ALKPHOS 99 83     Recent Labs     07/07/20  0530   INR 2.14*     Recent Labs     07/06/20  1229   TROPONINI 0.04*       Urinalysis:    No results found for: Beryl Sprinkle, BACTERIA, RBCUA, BLOODU, SPECGRAV, GLUCOSEU    Radiology:  XR CHEST PORTABLE   Final Result   Pulmonary venous hypertension with low left larger than right pleural   effusions and bibasilar atelectasis                 Assessment/Plan:    Active Hospital Problems    Diagnosis    Atrial fibrillation with RVR ([address] [I48.91]     Afib with RVR: improved with IV cardizem. On PO cardizem. Cardiac echo and  Cardio consult pending. Continue tele           Hypothyroidism: TSH 6.06, FT4 1.7. increase synthroid to 75mcg daily. Repeat TFTs in 4-6 weeks        Elevated LFTs:   - unclear etiology  - Viral hepatitis panel pending   - will need further work up as an outpatient     COPD  - no evidence of exacerbation  - continue home inhalers    Leucocytosis: likely reactive as resolved on repeat. Pt afebrile. UA pending. Hyponatremia: mild, unclear chronicity. Suspect due to vol depletion. On gentle IVF. Monitor       Acute metab encephalopathy:  Increasing confusion at home. Likely multifactorial due to hyponatremia, possible underlying dementia. UA pending. No focal neuro signs. Continue supportive care for acute morbidities. No JE or SIRS clinically, ruled out.        DVT Prophylaxis: eliquis   Diet: DIET GENERAL;  Code Status: Full Code    PT/OT Eval Status: ordered    Dispo - 1-3 days pending clinical course     Julian Santos MD

## 2020-07-07 NOTE — PROGRESS NOTES
Occupational Therapy   Occupational Therapy Initial Assessment  Date: 2020   Patient Name: Montserrat Thibodeaux  MRN: [id number]     : 1940    Date of Service: 2020    Discharge Recommendations:  24 hour supervision or assist, Home with Home health OT  OT Equipment Recommendations  Equipment Needed: No    Assessment   Performance deficits / Impairments: Decreased functional mobility ; Decreased cognition;Decreased ADL status; Decreased safe awareness;Decreased vision/visual deficit; Decreased strength;Decreased balance  Prognosis: Good  Decision Making: Medium Complexity  OT Education: OT Role;Plan of Care;ADL Adaptive Strategies;Transfer Training;Precautions; Family Education;Equipment  REQUIRES OT FOLLOW UP: Yes  Safety Devices  Safety Devices in place: Yes  Type of devices: Left in bed;Bed alarm in place;Nurse notified;Gait belt           Patient Diagnosis(es): The primary encounter diagnosis was Tachycardia. A diagnosis of Elevated troponin was also pertinent to this visit. has a past medical history of CAD (coronary artery disease) and Emphysema of lung ([address] has a past surgical history that includes hernia repair and Cataract removal.           Restrictions  Restrictions/Precautions  Restrictions/Precautions: Up as Tolerated  Position Activity Restriction  Other position/activity restrictions: telemetry    Subjective   General  Chart Reviewed: Yes  Patient assessed for rehabilitation services?: Yes  Family / Caregiver Present: Yes(spouse)  Referring Practitioner: Gauri Parsons MD 2020  Diagnosis: A-fib with RVR  Subjective  Subjective: Pt states he was up in the chair this morning, tired and would prefer to go back to bed. Pt with good sense of humor.    Patient Currently in Pain: Denies    Social/Functional History  Social/Functional History  Lives With: Spouse  Type of Home: House  Home Layout: One level, Laundry in basement(with basement)  Home Access: Stairs to enter with rails  Entrance Stairs - Number of Steps: 2  Bathroom Shower/Tub: Tub/Shower unit, Shower chair without back  Bathroom Toilet: Handicap height  Bathroom Equipment: Grab bars in shower  Home Equipment: Standard walker  ADL Assistance: (wife assists with bathing and lays clothing out)  Homemaking Responsibilities: No  Ambulation Assistance: Independent  Transfer Assistance: Independent  Active : No  Occupation: Retired  Type of occupation:        Objective        Orientation  Overall Orientation Status: Impaired  Orientation Level: Oriented to place;Oriented to person;Disoriented to time;Oriented to situation     Balance  Sitting Balance: Supervision  Standing Balance: Minimal assistance(with RW)  Functional Mobility  Functional - Mobility Device: Rolling Walker  Activity: Other(in room)  Assist Level: (CGA to Iliana with 1 LOB)  Functional Mobility Comments: Patient is legally blind impacting his ambulation as pt not familiar with hospital room layout.      ADL  LE Dressing: Maximum assistance  Tone RUE  RUE Tone: Normotonic  Tone LUE  LUE Tone: Normotonic  Coordination  Movements Are Fluid And Coordinated: Yes     Bed mobility  Supine to Sit: Contact guard assistance(to R with HOB elevated)  Sit to Supine: Stand by assistance  Transfers  Sit to stand: Contact guard assistance(up to RW)  Stand to sit: Contact guard assistance        LUE AROM (degrees)  LUE AROM : WFL  RUE AROM (degrees)  RUE AROM : WFL  LUE Strength  LUE Strength Comment: 3+ to 4-/5 grossly  RUE Strength  RUE Strength Comment: 3+ to 4-/5 grossly      Plan   Plan  Times per week: 3-5x's a week while in acute care           AM-PAC Score        AM-PAC Inpatient Daily Activity Raw Score: 15 (07/07/20 1620)  AM-PAC Inpatient ADL T-Scale Score : 34.69 (07/07/20 1620)  ADL Inpatient CMS 0-100% Score: 56.46 (07/07/20 1620)  ADL Inpatient CMS G-Code Modifier : CK (07/07/20 1620)    Goals  Short term goals  Time Frame for Short term goals: 1 week unless otherwise specified 7/14/2020  Short term goal 1: Pt will complete LE dressing with Iliana  Short term goal 2: Pt will complete toilet transfers with CGA by 7/12  Short term goal 3: Pt will complete standing level ADLs with CGA for balance  Patient Goals   Patient goals : \"to go home\"       Therapy Time   Individual Concurrent Group Co-treatment   Time In 1503         Time Out 1525         Minutes 22         Timed Code Treatment Minutes: 12 Minutes       Aleksandra Bach OTR/L    If pt is unable to be seen after this session, please let this note serve as discharge summary. Please see case management note for discharge disposition. Thank you.

## 2020-07-07 NOTE — CONSULTS
[address] [address]                                  CONSULTATION    PATIENT NAME: Darek Flaherty                  :        1940  MED REC NO:   [id number]                          ROOM:       4112  ACCOUNT NO:   [de-identified]                           ADMIT DATE: 2020  PROVIDER:     Michael Pierre. Laxmi Castro MD    CONSULT DATE:  2020    REASON FOR CONSULTATION:  Atrial fibrillation, tachycardia, elevated  troponin. HISTORY OF PRESENT ILLNESS:  An 76-year-old male presented to the  hospital with generalized weakness, shortness of breath, fatigue,  tachycardia. Symptoms has been going on for several weeks. It appears  that he has been slowly deteriorating in his general health. He has not  been eating well. He has lost weight. He denies any chest pain. He  states his breathing has been better since he has been here in the  hospital.  Shortness of breath has been constant, worse with any type of  activity. It has been decreased since he received treatment here in the  hospital.  No associated chest pain. PAST MEDICAL HISTORY:  1. Paroxysmal atrial fibrillation. He was previously on  anticoagulation, but stopped all of his cardiac medications. 2.  Right bundle-branch block. 3.  COPD. 4.  Coronary artery disease based on CT of the chest.  He has never had  an ischemic evaluation to their knowledge. SOCIAL HISTORY:  He is . Does not smoke. FAMILY HISTORY:  Positive for heart disease. REVIEW OF SYSTEMS:  No fevers or chills. No cough. No focal neurologic  symptoms. No headache or visual changes. No recent GI or  bleeding. No recent or upcoming surgeries. All other systems are negative except  history of present illness. ALLERGIES:  No known drug allergies. MEDICATIONS:  See list in the chart which I have reviewed.     PHYSICAL EXAMINATION:  VITAL SIGNS:  Blood pressure is 112/75, heart rate 92, respirations 16,  temperature 97.5. He is 95% saturated on room air. Physical exam deferred due to COVID-19 pandemic. DIAGNOSTIC DATA:  Significant laboratory data includes a troponin of  0.04, INR of 2.14. Chest x-ray shows pleural effusions. EKG initially  shows atrial fibrillation with rapid ventricular response, right  bundle-branch block. Subsequent EKG shows multifocal atrial rhythm,  premature ventricular contractions, right bundle-branch block. IMPRESSION:  1. Paroxysmal atrial fibrillation with rapid ventricular response,  exacerbated by the patient stopping all of his medications. 2.  Shortness of breath, multifactorial including pleural effusions,  COPD. 3.  Pleural effusion, etiology uncertain. 4.  Right bundle-branch block. 5.  COPD. 6.  Coronary artery disease based on CT of the chest.  No ischemic  workup in the past.  7.  Coagulopathy. Note that the patient stopped taking his warfarin  despite his INR is elevated. 8.  Elevated liver enzymes. RECOMMENDATIONS:  1.  Echocardiogram.  2.  Anticoagulation was initiated, but need to further evaluate  coagulopathy before continuing long term. 3.  Continue oral diltiazem. 4.  Add beta blocker. 5.  Aspirin. 6.  No statin due to elevated liver enzymes. KOURTNEY Clemente MD    D: 07/07/2020 15:52:49       T: 07/07/2020 18:35:17     [email or web address]  Job#: [id number]     Doc#: [id number]    CC:

## 2020-07-07 NOTE — PROGRESS NOTES
Nutrition Assessment    Type and Reason for Visit: Initial, Positive Nutrition Screen    Nutrition Recommendations:   1. Continue general diet to promote po intakes  2. Add [employer] with meals - monitor acceptance  3. Encourage po intakes - smaller, more frequent meals may help   4. Monitor po intakes, nutrition adequacy, weights, pertinent labs, BMs    Nutrition Assessment: Positive nutrition screen for weight loss and decreased appetite. Currently on a general diet. Spoke with pt wife. Pt nutritionally compromised d/t report of poor appetite over the past several weeks. Wife reports that pt has c/o lack of taste and is not wanting to eat. Wife has tried Ensure at home and pt refused it. Willing to try [employer] Encouraged po intakes. Wife unsure of weight changes and states that pt typically weighs between 120-130 lb.  lb 2.8 oz. Will continue general diet to promote appetite. Malnutrition Assessment:  · Malnutrition Status: At risk for malnutrition  · Context: Acute illness or injury  · Findings of the 6 clinical characteristics of malnutrition (Minimum of 2 out of 6 clinical characteristics is required to make the diagnosis of moderate or severe Protein Calorie Malnutrition based on AND/ASPEN Guidelines):  1. Energy Intake-Less than or equal to 75% of estimated energy requirement, Greater than or equal to 7 days    2. Weight Loss-Unable to assess,    3. Fat Loss-Unable to assess,    4. Muscle Loss-Unable to assess,    5. Fluid Accumulation-No significant fluid accumulation,    6.  Strength-Not measured    Nutrition Risk Level:  Moderate    Nutrient Needs:  · Estimated Daily Total Kcal: 5784-0489  · Estimated Daily Protein (g): 78-94  · Estimated Daily Total Fluid (ml/day): 1 ml/kcal    Nutrition Diagnosis:   · Problem: Inadequate oral intake  · Etiology: related to Insufficient energy/nutrient consumption, Altered taste perception     Signs and symptoms:  as evidenced by Diet history of poor intake    Objective Information:  · Nutrition-Focused Physical Findings: Blind  · Wound Type: None  · Current Nutrition Therapies:  · Oral Diet Orders: General   · Oral Diet intake: Unable to assess  · Oral Nutrition Supplement (ONS) Orders: None  · ONS intake: Unable to assess  · Anthropometric Measures:  · Ht: 5' 11\" (180.3 cm)   · Current Body Wt: 131 lb 2.8 oz (59.5 kg)  · % Weight Change:  ,  HOSSEIN - wife unsure and lack of weight Hx in EMR  · Ideal Body Wt: 172 lb (78 kg)   · BMI Classification: (S) BMI <18.5 Underweight    Nutrition Interventions:   Continue current diet, Start ONS  Continued Inpatient Monitoring    Nutrition Evaluation:   · Evaluation: Goals set   · Goals:  Tolerate diet and have po intakes 50% or greater this admission    · Monitoring: Meal Intake, Weight, Supplement Intake      Electronically signed by Tanesha Treadwell RD, ADRIENNE on 7/7/20 at 11:57 AM EDT    Contact Number: Office: [phone]; [address]: [phone]

## 2020-07-07 NOTE — CONSULTS
[id number]    Elev trop likely related to AF w/ RVR in setting of ischemic heart disease.  No angina   PAF  Pleural effusion  RBBB  COPD  CAD based on CT  Coagulopathy (has been taking warfarin)  Elev liver enzymes    Echo  AC initiated but need to further eval coagulopathy  Dilt oral  Add BBlk  ASA  No statin due to elev liver enzymes  No ischemic eval pending echo

## 2020-07-07 NOTE — PROGRESS NOTES
Tolerated  Position Activity Restriction  Other position/activity restrictions: telemetry  Vision/Hearing  Vision: Impaired  Vision Exceptions: Legally blind  Hearing: Within functional limits     Subjective  General  Chart Reviewed: Yes  Patient assessed for rehabilitation services?: Yes  Response To Previous Treatment: Not applicable  Family / Caregiver Present: Yes(wife)  Referring Practitioner: Phi Vasquez MD  Referral Date : 07/07/20  Diagnosis: A fib with RVR  Follows Commands: Within Functional Limits  General Comment  Comments: Pt resting in bed upon entry, RN cleared pt for therapy  Subjective  Subjective: Pt agreeable to PT  Pain Screening  Patient Currently in Pain: Denies  Vital Signs  Patient Currently in Pain: Denies  Pre Treatment Pain Screening  Intervention List: Patient able to continue with treatment    Orientation  Orientation  Overall Orientation Status: Impaired  Orientation Level: Disoriented to time;Oriented to person;Oriented to place;Oriented to situation  Social/Functional History  Social/Functional History  Lives With: Spouse  Type of Home: House  Home Layout: One level, Laundry in basement(with basement)  Home Access: Stairs to enter with rails  Entrance Stairs - Number of Steps: 2  Bathroom Shower/Tub: Tub/Shower unit, Shower chair without back  Bathroom Toilet: Handicap height  Bathroom Equipment: Grab bars in shower  Home Equipment: Standard walker  ADL Assistance: (wife assists with bathing and lays clothing out)  Homemaking Responsibilities: No  Ambulation Assistance: Independent  Transfer Assistance: Independent  Active : No  Occupation: Retired  Type of occupation:           Objective          AROM RLE (degrees)  RLE AROM: WFL  AROM LLE (degrees)  LLE AROM : WFL  Strength RLE  Strength RLE: WFL  Strength LLE  Strength LLE: WFL        Bed mobility  Supine to Sit: Contact guard assistance  Sit to Supine: Stand by assistance  Scooting: Stand by assistance(to scoot up in bed)  Transfers  Sit to Stand: Contact guard assistance  Stand to sit: Contact guard assistance  Ambulation  Ambulation?: Yes  Ambulation 1  Surface: level tile  Device: Rolling Walker  Assistance: Contact guard assistance;Minimal assistance  Quality of Gait: short shuffling gait with one episode of LOB, requiring Iliana to correct, Iliana at times to maneuver walker due to pt unfamiliar with surroundings and being legally blind  Gait Deviations: Slow Britta;Decreased step length;Decreased step height;Shuffles  Distance: 70 ft     Balance  Posture: Fair  Sitting - Static: Good  Sitting - Dynamic: Good  Standing - Static: Fair;+  Standing - Dynamic: Fair  Exercises  Straight Leg Raise: x 10 B  Heelslides: x 10 B  Hip Abduction: x 10 B  Ankle Pumps: x 10 B     Plan   Plan  Times per week: 3-5  Current Treatment Recommendations: Strengthening, Gait Training, ROM, Balance Training, Functional Mobility Training, Endurance Training, Transfer Training, Home Exercise Program  Safety Devices  Type of devices:  All fall risk precautions in place, Left in bed, Bed alarm in place, Call light within reach, Nurse notified, Gait belt, Patient at risk for falls      AM-PAC Score  AM-PAC Inpatient Mobility Raw Score : 18 (07/07/20 1626)  AM-PAC Inpatient T-Scale Score : 43.63 (07/07/20 1626)  Mobility Inpatient CMS 0-100% Score: 46.58 (07/07/20 1626)  Mobility Inpatient CMS G-Code Modifier : CK (07/07/20 1626)          Goals  Short term goals  Time Frame for Short term goals: 7/13/20 unless noted  Short term goal 1: Pt will perform bed mobility with supervision by 7/12/20  Short term goal 2: Pt will perform transfers with SBA  Short term goal 3: Pt will ambulate 80 ft with walker and CGA  Patient Goals   Patient goals : \"to go home\"       Therapy Time   Individual Concurrent Group Co-treatment   Time In 1503         Time Out 1525         Minutes 22         Timed Code Treatment Minutes: 12 Minutes    If pt is discharged prior to next

## 2020-07-07 NOTE — PLAN OF CARE
Problem: Falls - Risk of:  Goal: Will remain free from falls  Description: Will remain free from falls  Outcome: Ongoing  Note: Pt will remain free from falls throughout hospital stay. Fall precautions in place, bed alarm on, bed in lowest position with wheels locked and side rails 2/4 up. Room door open and hourly rounding completed. Will continue to monitor throughout shift. Problem: Safety:  Goal: Free from accidental physical injury  Description: Free from accidental physical injury  Outcome: Ongoing     Problem: Pain:  Goal: Control of acute pain  Description: Control of acute pain  Outcome: Ongoing  Note: Pt will be satisfied with pain control. Pt uses numeric pain rating scale with reassessments after pain med administration. Will continue to monitor progression throughout shift.

## 2020-07-07 NOTE — PROGRESS NOTES
Pt. Sitting up in chair. Alert/oriented but forgetful @x. afib controlled rate on monitor. Vitals and assessment stable as charted. Denies any pain/nausea or any other discomfort at present time. Call light in reach. Will continue to monitor.

## 2020-07-08 NOTE — PROGRESS NOTES
Occupational Therapy/Physical Therapy  Attempted to see pt for follow-up, but per chart review and discussion with RN, pt agitated at this time and restrained. Will re-attempt OT/PT follow-ups when pt appropriate for therapy and as schedule permits. Thank you!     Cary Grant, OTR/BIANCA Marcus, PT, DPT

## 2020-07-08 NOTE — FLOWSHEET NOTE
Pt bladder scanned with 321 ml in bladder. Pt is on fluids 75ml/hr. Will notify MD on duty and will pass to dayshift.

## 2020-07-08 NOTE — CARE COORDINATION
CASE MANAGEMENT INITIAL ASSESSMENT      Reviewed chart and completed assessment via telephone with: spouse via hospital room phone. Explained Case Management role/services. Primary contact information: spouse, Elizabeth Gonzalez [phone]    Admit date/status: inpatient 7/6/20  Diagnosis: atrial fibrillation  Is this a Readmission? no    Insurance: [employer] Medicare    Precert required for SNF - Y        3 night stay required - N    Living arrangements, Adls, care needs, prior to admission: lives with spouse in single story home with basement. Pt needs some assistance with adls (spouse provides). Does not drive d/t being \"legally blind\". Transportation: TBD    [address] [employer] at home: Walker_x_Cane_x_RTS__ BSC__Shower Chair__  02__ HHN__ CPAP__  BiPap__  Hospital Bed__ W/C___ Other__________    Services in the home and/or outpatient, prior to admission: none    PT[facility]OT [facility] ([facility] not started     Barriers to discharge: none identified    Plan/comments: per spouse, she would be agreeable to recommended services. Would prefer pt NOT go to skilled nursing facility. No preference for home care agency. Referral called to [facility].     ECOC on chart for MD brian Cantu RN CM

## 2020-07-08 NOTE — CONSULTS
[address] [address]                                  CONSULTATION    PATIENT NAME: Kaya Gibson                  :        1940  MED REC NO:   [id number]                          ROOM:       9178  ACCOUNT NO:   [de-identified]                           ADMIT DATE: 2020  PROVIDER:     Violet Enrique MD    CONSULT DATE:  2020    REASON FOR CONSULTATION:  Hyponatremia. HISTORY OF PRESENT ILLNESS:  The patient is an 56-year-old   male patient who presented to [facility], complaining of  fatigue and generalized weakness. Upon presentation, he was noted to be  hyponatremic with a serum sodium of 132, slowing trending down to 130  mEq/L, which prompted Nephrology consultation. The patient complained  of decreased appetite lately and has been drinking water mostly. PAST MEDICAL HISTORY:  1. Coronary artery disease. 2.  Emphysema. PAST SURGICAL HISTORY:  1. Hernia repair. 2.  Cataract removal.    ALLERGIES:  The patient has no known drug allergies. SOCIAL HISTORY:  The patient does not smoke or drink alcohol. FAMILY HISTORY:  Significant for heart failure and macular degeneration  in his mother, COPD in his brother. REVIEW OF SYSTEMS:  The patient denies any fevers, chills, cough, or  expectorations. Otherwise, a 10-point review of systems was relatively  unremarkable. PHYSICAL EXAMINATION:  VITAL SIGNS:  Blood pressure 135/83, heart rate 85, respirations 16, and  temperature 96.5 Fahrenheit. The patient is saturating 95% on room air. GENERAL APPEARANCE:  The patient is alert and oriented x3, not in acute  distress. HEENT:  Eyes revealed normal conjunctivae and reactive pupils. NECK:  Revealed midline trachea and nonpalpable thyroid. LUNGS:  Clear to anterior auscultation bilaterally, nonlabored  breathing.   CARDIOVASCULAR:  Exam revealed S1 and S2, regular rate and rhythm. No  murmurs or rubs. No peripheral edema. ABDOMEN:  Exam revealed soft abdomen, no organomegaly. SKIN:  Revealed no lesions or rashes. Warm to touch. PSYCHIATRIC:  Revealed good judgment and insight. LYMPHATICS:  Revealed no cervical or axillary adenopathies. ASSESSMENT AND PLAN:  Hyponatremia, multifactorial and likely secondary  to increased free water intake along with decreased solids and  hypovolemia. Serum sodium is stabilized at 130 to 132 mEq/L.    RECOMMENDATIONS:  1. Continue gentle volume expansion. 2.  Apply daily free water restriction. 3.  Check urine electrolytes and urine osmolality. 4.  Atrial fibrillation management per Cardiology.         Cong Peace MD    D: 07/08/2020 17:22:30       T: 07/08/2020 19:07:25     [email or web address]  Job#: [id number]     Doc#: [id number]    CC:

## 2020-07-08 NOTE — PROGRESS NOTES
Patient transported to CT for CT head with transport and nurse. Returned to room with out incident. Restraints remain in place.

## 2020-07-08 NOTE — PROGRESS NOTES
Limited due to agitation and in restraints in UE's. Speech clear with no overt facial droop  Psychiatric: Alert and oriented to self only, thought content inappropriate, no  insight      Labs:   Recent Labs     07/06/20  1112 07/07/20  0530 07/08/20  0821   WBC 12.4* 10.0 12.0*   HGB 17.9* 16.2 15.6   HCT 53.0* 48.0 46.0   PLT 64* 61* 58*     Recent Labs     07/06/20  1229 07/07/20  0530 07/08/20  0821   * 132* 130*   K 4.7 4.7 4.1   CL 95* 98* 96*   CO2 21 21 20*   BUN 22* 27* 31*   CREATININE 1.3 1.2 1.1   CALCIUM 9.1 8.4 8.3     Recent Labs     07/06/20  1229 07/07/20  0530 07/08/20  0821   AST 82* 406* 161*   ALT 67* 249* 191*   BILIDIR  --  0.7*  --    BILITOT 2.1* 1.9* 1.7*   ALKPHOS 99 83 82     Recent Labs     07/07/20  0530 07/08/20  1009   INR 2.14* 2.34*     Recent Labs     07/06/20  1229   TROPONINI 0.04*       Urinalysis:      Lab Results   Component Value Date    NITRU Negative 07/07/2020    WBCUA 0-2 07/07/2020    BACTERIA 1+ 07/07/2020    RBCUA 0-2 07/07/2020    BLOODU TRACE-INTACT 07/07/2020    SPECGRAV >=1.030 07/07/2020    GLUCOSEU Negative 07/07/2020       Radiology:  XR CHEST PORTABLE   Final Result   Pulmonary venous hypertension with low left larger than right pleural   effusions and bibasilar atelectasis                 Assessment/Plan:    Active Hospital Problems    Diagnosis    Atrial fibrillation with RVR ([address] [I48.91]     Afib with RVR: improved with IV cardizem. On PO cardizem. Cardio assisting. Cardiac echo pending. Continue tele           Hypothyroidism: TSH 6.06, FT4 1.7. increased synthroid to 75mcg daily. Repeat TFTs in 4-6 weeks        Elevated LFTs:  unclear etiology. Acute hepatitis panel neg. RUQ US ordered. LFTs improving. Monitor     Hyponatremia: thought to be due to volume depletion but worsening despite IV fluids.   Further eval with osmolality studies, nephro consult     COPD  - no evidence of exacerbation  - continue home inhalers            Acute metab encephalopathy:  Increasing confusion at home. Likely multifactorial due to hyponatremia, possible underlying dementia. Pt delirious today. CT head ordered. Supportive care with for acute morbidities         Acute urinary retention: unclear etiology. UA neg for UTI. Place coronado. Start flomax.   Urology consult            DVT Prophylaxis: eliquis   Diet: DIET GENERAL;  Dietary Nutrition Supplements: Frozen Oral Supplement  Code Status: Full Code    PT/OT Eval Status: ordered    Dispo - 2-3 pending clinical course     Eric Lancaster MD

## 2020-07-08 NOTE — PROGRESS NOTES
Patient remains confused and having hallucinations. Agitated at times with care. Restraints remain in place. Bladder scan shows 580ml. MD aware and new order for coronado catheter.

## 2020-07-08 NOTE — CONSULTS
Patient seen and examined, consult note dictated. Assessment and Plan:    1- Hyponatremia: Multifactorial and likely secondary to increased free water intake along with decreased solutes and hypovolemia; serum sodium stabilized at 130 to 132 meq/L.  - Continue gentle volume expansion.  - Apply daily free water restriction.  - Check urine electrolytes and Osmolality. 2- Atrial fibrillation: Management per cardiology.
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
## 2020-07-08 NOTE — PROGRESS NOTES
[address]   Progress Note  Cardiology    CC: weakness. Consult for AF    HPI: disoriented, no cp/sob    Medications/Labs all Reviewed    Lab Results   Component Value Date    WBC 12.0 (H) 07/08/2020    HGB 15.6 07/08/2020    HCT 46.0 07/08/2020    MCV 97.4 07/08/2020    PLT 58 (L) 07/08/2020     Lab Results   Component Value Date    CREATININE 1.1 07/08/2020    BUN 31 (H) 07/08/2020     (L) 07/08/2020    K 4.1 07/08/2020    CL 96 (L) 07/08/2020    CO2 20 (L) 07/08/2020     Lab Results   Component Value Date    INR 2.14 (H) 07/07/2020    PROTIME 25.0 (H) 07/07/2020        Physical Examination:    BP (!) 122/96   Pulse 90   Temp 97.3 °F (36.3 °C) (Axillary)   Resp 16   Ht 5' 11\" (1.803 m)   Wt 131 lb 2.8 oz (59.5 kg)   SpO2 93%   BMI 18.30 kg/m²      Deferred due to covid pandemic    Assessment:    Elev trop likely related to AF w/ RVR in setting of ischemic heart disease.  No angina   PAF - rates improved  Pleural effusion  RBBB  COPD  CAD based on CT - no angina  Coagulopathy (has not been taking warfarin as OP)  Elev liver enzymes - decreased today      Plan  Echo  AC initiated but need to further eval coagulopathy before continuing long term  Repeat INR  Dilt and BBlk  ASA  No statin due to elev liver enzymes  No ischemic eval pending echo      Irvin Hidalgo MD, 7/8/2020 8:58 AM

## 2020-07-09 NOTE — FLOWSHEET NOTE
Report received from Jose Amado. Agree with previous assessment. Pt is going for repeat CT of the chest. Currently sleeping comfortably in bed. Scant bleeding noted from skin abrasions/tears to bilateral forearms. Dressing intact. Wife at bedside. Bed in lowest position, wheels locked, bed alarm on and audible. AVASYS in place.

## 2020-07-09 NOTE — PLAN OF CARE
Patient remains in restraints for his safety, RN will continue to assess the need and monitor the patient closely.

## 2020-07-09 NOTE — CONSULTS
Urology Consult Note      Reason for Consult:  Urinary retention    History:   Pt is an 80 yo WM admitted for a-fib. Yesterday he was unable to urinate, PVR almost 600mL. Coude catheter placed by nursing staff. His wife is present and states he has no voiding difficulties at home and he has never seen a urologist.  He just completed therapy and is back in bed. He is quite fatigued. Meds: see med rec  Family History, Social History, Review of Systems:  Reviewed and agreed to as per chart    Exam:    Vitals:  /82   Pulse 82   Temp 97.7 °F (36.5 °C) (Axillary)   Resp 14   Ht 5' 11\" (1.803 m)   Wt 131 lb 2.8 oz (59.5 kg)   SpO2 94%   BMI 18.30 kg/m²   Temp  Av.6 °F (35.9 °C)  Min: 96 °F (35.6 °C)  Max: 97.7 °F (36.5 °C)    Intake/Output Summary (Last 24 hours) at 2020 1042  Last data filed at 2020 0847  Gross per 24 hour   Intake 1846 ml   Output 875 ml   Net 971 ml       Physical:    Well developed, well nourished in no acute distress   Mood indicates no abnormalities. Pt doesnt appear depressed   Orientated to time and place   Neck is supple, trachea is midline   Respiratory effort is normal   Cardiovascular show no extremity swelling   Abdomen no masses or hernias are palpated, there is no tenderness. Liver and Spleen appear normal.   Skin show no abnormal lesions   Lymph nodes are not palpated in the inguinal, neck, or axillary area.      Male :   Penis appears normal and circumcised   Urethral meatus is normal in size and location   Scrotum appears normal and both testicles appear normal in size and location   Refused CHACE-\"I am too tired\"        Labs:  WBC:    Lab Results   Component Value Date    WBC 12.0 2020     Hemoglobin/Hematocrit:    Lab Results   Component Value Date    HGB 15.6 2020    HCT 46.0 2020     BMP:    Lab Results   Component Value Date     2020    K 4.3 2020    CL 97 2020    CO2 19 2020    BUN 31 07/09/2020    LABALBU 3.2 07/08/2020    CREATININE 0.8 07/09/2020    CALCIUM 8.3 07/09/2020    GFRAA >60 07/09/2020    LABGLOM >60 07/09/2020     PT/INR:    Lab Results   Component Value Date    PROTIME 27.4 07/08/2020    INR 2.34 07/08/2020     PTT:  No results found for: APTT[APTT    Urinalysis:     Imaging:      Impression/Plan:   Urinary retention  Start flomax  If home today, then home with coronado, f/u next week for VT      Ale Beltrán PA-C

## 2020-07-09 NOTE — PROGRESS NOTES
[address]  Cardiology  Progress Note    Admission date:  2020    Reason for follow up visit: atrial fibrillation     HPI/CC: Ben Cronin is a 79 y.o. male who presented to the hospital with shortness of breath, generalized weakness, fatigue, and tachycardia. Ongoing for several weeks. He was noted to be in atrial fibrillation with RVR on admit. Also noted to be hyponatremic on admission  He has been noncompliant with medications prior to admission. Echo pending. Subjective: Resting in bed, wife at bedside. Denies chest pain, shortness of breath, palpitations, orthopnea, or PND. Stated some dizziness when he first got up with PT today. Vitals:  Blood pressure 136/86, pulse 80, temperature 96.2 °F (35.7 °C), temperature source Axillary, resp. rate 16, height 5' 11\" (1.803 m), weight 131 lb 2.8 oz (59.5 kg), SpO2 94 %.   Temp  Av.6 °F (35.9 °C)  Min: 96 °F (35.6 °C)  Max: 97.7 °F (36.5 °C)  Pulse  Av.7  Min: 80  Max: 107  BP  Min: 117/82  Max: 136/86  SpO2  Av.5 %  Min: 92 %  Max: 94 %    24 hour I/O    Intake/Output Summary (Last 24 hours) at 2020 1427  Last data filed at 2020 1344  Gross per 24 hour   Intake 1846 ml   Output 1050 ml   Net 796 ml     Current Facility-Administered Medications   Medication Dose Route Frequency Provider Last Rate Last Dose    haloperidol lactate (HALDOL) injection 2 mg  2 mg Intramuscular Q6H PRN Matt Smith MD        tamsulosin [facility]) capsule 0.4 mg  0.4 mg Oral Daily Ady Casiano MD   0.4 mg at 20 1224    sodium bicarbonate 100 mEq in sodium chloride 0.45 % 1,000 mL infusion   Intravenous Continuous Violet Enrique MD 75 mL/hr at 20 214      levothyroxine (SYNTHROID) tablet 75 mcg  75 mcg Oral Daily Ady Casiano MD   75 mcg at 20 0525    metoprolol tartrate (LOPRESSOR) tablet 25 mg  25 mg Oral BID Izaiah Grant MD   25 mg at 20 1224    tiotropium (SPIRIVA RESPIMAT) 2.5 MCG/ACT inhaler 2 puff  2 puff Inhalation Daily Silke Ross MD   2 puff at 07/09/20 0757    budesonide-formoterol (SYMBICORT) 160-4.5 MCG/ACT inhaler 2 puff  2 puff Inhalation BID Silke Ross MD   2 puff at 07/09/20 0757    sodium chloride flush 0.9 % injection 10 mL  10 mL Intravenous 2 times per day Silke Ross MD   10 mL at 07/07/20 0844    sodium chloride flush 0.9 % injection 10 mL  10 mL Intravenous PRN Silke Ross MD        acetaminophen (TYLENOL) tablet 650 mg  650 mg Oral Q6H PRN Silke Ross MD        Or   [facility] acetaminophen (TYLENOL) suppository 650 mg  650 mg Rectal Q6H PRN Silke Ross MD        polyethylene glycol [facility]) packet 17 g  17 g Oral Daily PRN Silke Ross MD        promethazine (PHENERGAN) tablet 12.5 mg  12.5 mg Oral Q6H PRN Silke Ross MD        Or    ondansetron [facility]) injection 4 mg  4 mg Intravenous Q6H PRN Silke Ross MD        dilTIAZem (CARDIZEM) tablet 30 mg  30 mg Oral 4 times per day Silke Ross MD   30 mg at 07/09/20 1225    apixaban (ELIQUIS) tablet 2.5 mg  2.5 mg Oral BID Silke Ross MD   2.5 mg at 07/09/20 1224       Review of Systems   Constitutional: Positive for activity change and fatigue. Respiratory: Negative for cough, shortness of breath and wheezing. Cardiovascular: Negative for chest pain, palpitations and leg swelling. Musculoskeletal: Positive for gait problem. Neurological: Positive for dizziness and weakness. Psychiatric/Behavioral: Negative for sleep disturbance.        Objective:     Telemetry monitor: NSR RBBB Occasional PVC single and in pairs    Physical Exam:  Constitutional:  Comfortable and alert, NAD, appears stated age  Eyes: PERRL, sclera nonicteric  Neck:  Supple, no masses, no thyroidmegaly, no JVD  Skin:  Warm and dry; no rash or lesions  Heart:  Regular, normal apex, S1 and S2 normal, no M/G/R  Lungs:  Normal respiratory effort; faint rales to RLL, otherwise clear  Abdomen: soft, non tender, + bowel sounds  Extremities:  No edema or cyanosis; no clubbing  Neuro: alert and oriented to person, moves legs and arms equally, normal mood and affect      Data Reviewed:  US RUQ 7/8/2020: Impression Stones and sludge within the gallbladder. No biliary ductal dilatation   Right-sided pleural effusion   Mild lobular contour of the liver is seen, raising the question of early cirrhotic changes     CXR 7/6/2020:  FINDINGS:   Cardiomegaly.  Prominent upper lobe pulmonary vessels.  Left larger than   right pleural effusions with strandy bibasilar airspace disease.           Impression   Pulmonary venous hypertension with low left larger than right pleural   effusions and bibasilar atelectasis             Lab Reviewed:   Renal Profile:  Lab Results   Component Value Date    CREATININE 0.8 07/09/2020    BUN 31 07/09/2020     07/09/2020    K 4.3 07/09/2020    CL 97 07/09/2020    CO2 19 07/09/2020     CBC:    Lab Results   Component Value Date    WBC 12.0 07/08/2020    RBC 4.73 07/08/2020    HGB 15.6 07/08/2020    HCT 46.0 07/08/2020    MCV 97.4 07/08/2020    RDW 14.5 07/08/2020    PLT 58 07/08/2020     BNP:  No results found for: PROBNP  Fasting Lipid Panel:  No results found for: CHOL, HDL, TRIG  Cardiac Enzymes:  CK/MbTroponin  Lab Results   Component Value Date    TROPONINI 0.04 07/06/2020     PT/ INR   Lab Results   Component Value Date    INR 2.34 07/08/2020    INR 2.14 07/07/2020    PROTIME 27.4 07/08/2020    PROTIME 25.0 07/07/2020     PTT No results found for: PTT No results found for: MG No results found for: TSH    All labs and imaging reviewed today    Assessment:  1. Elevated troponin: likely related to Afib with RVR in setting of ischemic heart disease   2. PAF: NSR RBBB at this time with PVCs   -[id number]-GPAx Score for Atrial Fibrillation Stroke Risk 2  3. Pleural effusion: CT ordered   4. RBBB: ongoing   5. CAD: based on CT- no angina   6.   Coagulopathy: has been noncompliant with medications at home   7. Elevated Liver enzymes: noted to be trending down   --hepatitis panel- non-reactive    - US showed early cirrhotic changes  8. Hyponatremia: nephrology following   9. Dementia    Plan:   1. Echo pending- further recommendations pending echo  2. Continue aspirin, cardizem (will change to long acting upon discharge) & lopressor  3. No statin due to elevated liver enzymes  4. Repeat INR and hepatic function tomorrow am   5. Agree with anticoagulation being held, INR noted to increase vitamin K ordered per IM  6.  Continue to monitor telemetry     Maria L Rolon, APRN - [address]  [phone]

## 2020-07-09 NOTE — PROGRESS NOTES
Per a discussion with Dr. Jason Cordova, RN to trial patient off of restraints. Wife is at the bedside. Right wrist restraint discontinued, left restraint in place. RN to place sitter at bedside per Dr. Jason Cordova.

## 2020-07-09 NOTE — PROGRESS NOTES
Occupational Therapy  Facility/Department: [facility] B3 - MED SURG  Daily Treatment Note  NAME: Mj Duckworth  : 1940  MRN: [id number]    Date of Service: 2020    Discharge Recommendations:  24 hour supervision or assist, Home with Home health OT, Continue to assess pending progress  OT Equipment Recommendations  Equipment Needed: No    Assessment   Performance deficits / Impairments: Decreased functional mobility ; Decreased cognition;Decreased ADL status; Decreased safe awareness;Decreased vision/visual deficit; Decreased strength;Decreased balance;Decreased endurance  Assessment: Pt pleasantly confused but agreeable to treatment this date, requiring up to Mod A for initial stand from EOB, but improving to CGA by end of session. Per pt's wife and chart review, pt remained in bed yesterday d/t agitation/confusion, but pt's wife reporting pt walking similarly to baseline. Pt limited by vision and being in an unfamiliar environment as well as decreased cognition this date. Pt's wife reports her DIL and granddaughter are available for supervision/assist at d/c. Continue to assess with OT tx.   OT Education: OT Role;Plan of Care;ADL Adaptive Strategies;Transfer Training;Precautions; Family Education;Equipment;Orientation; Energy Conservation  Patient Education: importance of mobility, positioning  Barriers to Learning: Cognition, confusion  REQUIRES OT FOLLOW UP: Yes  Activity Tolerance  Activity Tolerance: Patient limited by fatigue;Treatment limited secondary to decreased cognition  Activity Tolerance: Pt vitals stable during session, Spo2 >/= 90% on RA during session, RN aware  Safety Devices  Safety Devices in place: Yes  Type of devices: Bed alarm in place;Nurse notified;Gait belt;Left in bed;Call light within reach  Restraints  Initially in place: Yes  Restraints: 2-point UE restraints in place at beginning of session; Per RN, okay to leave restraints off at end of session as pt following commands and not agitated          Patient Diagnosis(es): The primary encounter diagnosis was Tachycardia. A diagnosis of Elevated troponin was also pertinent to this visit. has a past medical history of CAD (coronary artery disease) and Emphysema of lung ([address] has a past surgical history that includes hernia repair and Cataract removal.    Restrictions  Restrictions/Precautions  Restrictions/Precautions: General Precautions, Fall Risk, Up as Tolerated  Position Activity Restriction  Other position/activity restrictions: telemetry, pt is legally blind  Subjective   General  Chart Reviewed: Yes  Patient assessed for rehabilitation services?: Yes  Family / Caregiver Present: Yes(Pt's wife)  Referring Practitioner: Yesika Trotter MD 7/7/2020  Diagnosis: A-fib with RVR  Subjective  Subjective: Pt in bed on arrival, pleasantly confused, alert and agreeable to treatment and getting OOB  General Comment  Comments: Per RN okay to treat and remove restraints for therapy  Vital Signs  Patient Currently in Pain: Yes(Pt reporting \"sore\" at buttocks and back, did not rate and satisfied with repositioning/increased activity)   Orientation  Orientation  Overall Orientation Status: Impaired  Orientation Level: Oriented to place;Oriented to person;Disoriented to time;Oriented to situation  Objective    ADL  Feeding: Setup;Minimal assistance; Beverage management  Grooming: Setup;Minimal assistance  UE Dressing: Maximum assistance(to change gown)  LE Dressing: Maximum assistance;Setup; Increased time to complete(for pants, socks; pt attempted to thread pants, limited by vision)  Toileting: Dependent/Total(coronado)        Balance  Sitting Balance: Supervision(chair level)  Standing Balance: Minimal assistance(to CGA at RW)  Standing Balance  Time: 1-2 minutes, 2-3 minutes over multiple trials  Activity: mobility, standing ADL  Functional Mobility  Functional - Mobility Device: Rolling Walker  Activity: Other(in-room mobility)  Assist Level: Dependent/Total(Min-Mod A x2, assist for walker/line management and balance)  Functional Mobility Comments: Pt limited by vision and multiple lines this date  Bed mobility  Supine to Sit: Contact guard assistance(tactile/verbal cues for sequencing/initiation)  Sit to Supine: Minimal assistance  Scooting: Stand by assistance(to EOB, further back in chair, laterally at EOB)  Comment: Pt declined remaining up in chair at end of session despite encouragement d/t fatigue  Transfers  Sit to stand: Moderate assistance;Contact guard assistance;Minimal assistance(first trial Mod A from EOB, Min A then CGA from chair level)  Stand to sit: Contact guard assistance;Minimal assistance  Transfer Comments: Tactile/verbal cues for safe hand placement                       Cognition  Overall Cognitive Status: Exceptions  Arousal/Alertness: Delayed responses to stimuli  Following Commands: Follows one step commands with increased time; Follows one step commands with repetition  Attention Span: Attends with cues to redirect; Difficulty dividing attention  Memory: Decreased recall of recent events;Decreased short term memory  Safety Judgement: Decreased awareness of need for assistance;Decreased awareness of need for safety  Problem Solving: Assistance required to implement solutions;Assistance required to generate solutions  Insights: Decreased awareness of deficits  Initiation: Requires cues for all  Sequencing: Requires cues for some  Cognition Comment: Pt pleasantly confused and cooperative throughout session            Plan   Plan  Times per week: 3-5x's a week while in acute care  Current Treatment Recommendations: Strengthening, ROM, Endurance Training, Patient/Caregiver Education & Training, Equipment Evaluation, Education, & procurement, Self-Care / ADL, Cognitive Reorientation, Safety Education & Training, Positioning, Home Management Training, Gait Training, Balance Training, Functional Mobility Training    AM-PAC Score

## 2020-07-09 NOTE — PROGRESS NOTES
Physical Therapy  Facility/Department: [facility] B3 - MED SURG  Daily Treatment Note  NAME: Billie Holloway  : 1940  MRN: [id number]    Date of Service: 2020    Discharge Recommendations:  24 hour supervision or assist, Home with Home health PT, Continue to assess pending progress(CTA pending improvement with OOB mobility, anticipate 24/7 and home PT)   PT Equipment Recommendations  Equipment Needed: Yes  Mobility Devices: Mandy Hebert: Rolling    Assessment    Body structures, Functions, Activity limitations: Decreased functional mobility ; Decreased balance;Decreased endurance;Decreased strength;Decreased posture  Assessment: Patient seen for gait and transfer training. Patient cleared by RN for therapy participation this date. Patient agreeable to therapy. Patient completed transfers with variable assist levels, progressing from mod A to CGA with RW with cues and assistance for hand placement. Pt required min A x2 with RW for ambulation 20 ft with assistance and cues for path navigation and maneuvering RW d/t pt legally blind. Pt required cues to keep RW closer to pt for safety; however, pt resistant. Pt up in chair ~10 mins with pt reporting fatigue and requesting to return to bed EOS despite encouragement and VSS. Pt educated on benefits of sitting up in chair for pressure relief and pain. P.T .will continue to follow throughout LOS. Will CTA d/c recommendation pending improved activity tolerance, anticipate home with 24/7 and home PT. Treatment Diagnosis: decreased indep with mobility  Prognosis: Good  Decision Making: Medium Complexity  PT Education: Goals;Transfer Training;Equipment;PT Role;Energy Conservation; Functional Mobility Training;Plan of Care;Home Exercise Program;Gait Training;General Safety; Family Education;Orientation  Patient Education: pt verbalized understanding  Barriers to Learning: mild decrease in cognition  REQUIRES PT FOLLOW UP: Yes  Activity Tolerance  Activity Tolerance: Patient Tolerated treatment well;Patient limited by fatigue;Patient limited by endurance  Activity Tolerance: limited by blindness; pt reporting very fatigued EOS and declining to remain sitting up in chair, VSS     Patient Diagnosis(es): The primary encounter diagnosis was Tachycardia. A diagnosis of Elevated troponin was also pertinent to this visit. has a past medical history of CAD (coronary artery disease) and Emphysema of lung ([address] has a past surgical history that includes hernia repair and Cataract removal.    Restrictions  Restrictions/Precautions  Restrictions/Precautions: General Precautions, Fall Risk, Up as Tolerated  Position Activity Restriction  Other position/activity restrictions: telemetry, pt is legally blind  Subjective   General  Chart Reviewed: Yes  Response To Previous Treatment: Patient with no complaints from previous session. Family / Caregiver Present: Yes(wife )  Referring Practitioner: MD Stephenie  Subjective  Subjective: pt in bed, agreeable to PT  Pain Screening  Patient Currently in Pain: Denies  Vital Signs  Patient Currently in Pain: Denies       Orientation  Orientation  Overall Orientation Status: Impaired  Orientation Level: Disoriented to time;Oriented to person;Oriented to place;Oriented to situation  Cognition   Cognition  Overall Cognitive Status: Exceptions  Arousal/Alertness: Delayed responses to stimuli  Following Commands: Follows one step commands with increased time; Follows one step commands with repetition  Attention Span: Attends with cues to redirect; Difficulty dividing attention  Memory: Decreased recall of recent events;Decreased short term memory  Safety Judgement: Decreased awareness of need for assistance;Decreased awareness of need for safety  Problem Solving: Assistance required to implement solutions;Assistance required to generate solutions  Insights: Decreased awareness of deficits  Initiation: Requires cues for all  Sequencing: Requires cues for some  Cognition Comment: Pt pleasantly confused and cooperative throughout session  Objective   Bed mobility  Supine to Sit: Contact guard assistance  Sit to Supine: Minimal assistance(for BLE)  Scooting: Stand by assistance(to EOB, back in chair, to [facility] )  Transfers  Sit to Stand: Contact guard assistance;Minimal Assistance; Moderate Assistance  Stand to sit: Contact guard assistance  Bed to Chair: Minimal assistance;2 Person Assistance(min A x2 with RW)  Comment: pt required mod A for first STS, min A for 2nd, and CGA for third transfer (each with RW)  Ambulation  Ambulation?: Yes  Ambulation 1  Surface: level tile  Device: Rolling Walker  Assistance: Minimal assistance;2 Person assistance(min A x2)  Quality of Gait: Pt ambulates with short step length/height requiring min A x2 for balance and navigation with management of RW partially d/t pt being legally blind. Gait Deviations: Slow Britta;Decreased step length;Decreased step height;Shuffles  Distance: 1x 20 ft   Stairs/Curb  Stairs?: No                              AM-PAC Score  AM-PAC Inpatient Mobility Raw Score : 18 (07/09/20 1215)  AM-PAC Inpatient T-Scale Score : 43.63 (07/09/20 1215)  Mobility Inpatient CMS 0-100% Score: 46.58 (07/09/20 1215)  Mobility Inpatient CMS G-Code Modifier : CK (07/09/20 1215)          Goals  Short term goals  Time Frame for Short term goals: 7/13/20 unless noted  Short term goal 1: Pt will perform bed mobility with supervision by 7/12/20  Short term goal 2: Pt will perform transfers with SBA  Short term goal 3: Pt will ambulate 80 ft with walker and CGA  Patient Goals   Patient goals : \"to go home\"    Plan    Plan  Times per week: 3-5  Current Treatment Recommendations: Strengthening, Gait Training, ROM, Balance Training, Functional Mobility Training, Endurance Training, Transfer Training, Home Exercise Program, Safety Education & Training, Patient/Caregiver Education & Training  Safety Devices  Type of devices:  All fall risk precautions in place, Call light within reach, Nurse notified, Gait belt, Patient at risk for falls, Left in chair, Chair alarm in place, Telesitter in use(wife present; per RN, ok to leave restraints untied)  Restraints  Initially in place: Yes  Restraints: per RN, ok to leave untied EOS     Therapy Time   Individual Concurrent Group Co-treatment   Time In 0941         Time Out 1035         Minutes 54         Timed Code Treatment Minutes: 47 Minutes     If pt is unable to be seen after this session, please let this note serve as discharge summary. Please see case management note for discharge disposition. Thank you.     Maira Tamayo, PT

## 2020-07-09 NOTE — PLAN OF CARE
Problem: Daily Care:  Goal: Daily care needs are met  Description: Daily care needs are met  Outcome: Ongoing  Patient calls out as needed for pain intervention, will continue to monitor and administer intervention as ordered and requested.

## 2020-07-09 NOTE — PROGRESS NOTES
Hospitalist Progress Note      PCP: Esther Castañeda DO    Date of Admission: 7/6/2020    Chief Complaint: fatigue     Hospital Course:     78 yo M w history of dementia p/w afib with RVR, hospital course complicated by encephalopathy/delirium, urinary retention     Subjective:     Agitated again last night in restraints, removed now and calm. Worn out after working with PT/OT this AM. No chest pain, sob. Denies new complaints. Medications:  Reviewed    Infusion Medications    IV infusion builder 75 mL/hr at 07/08/20 2149     Scheduled Medications    tamsulosin  0.4 mg Oral Daily    levothyroxine  75 mcg Oral Daily    metoprolol tartrate  25 mg Oral BID    tiotropium  2 puff Inhalation Daily    budesonide-formoterol  2 puff Inhalation BID    sodium chloride flush  10 mL Intravenous 2 times per day    dilTIAZem  30 mg Oral 4 times per day    apixaban  2.5 mg Oral BID     PRN Meds: haloperidol lactate, sodium chloride flush, acetaminophen **OR** acetaminophen, polyethylene glycol, promethazine **OR** ondansetron      Intake/Output Summary (Last 24 hours) at 7/9/2020 1419  Last data filed at 7/9/2020 1344  Gross per 24 hour   Intake 1846 ml   Output 1050 ml   Net 796 ml       Physical Exam Performed:    /86   Pulse 80   Temp 96.2 °F (35.7 °C) (Axillary)   Resp 16   Ht 5' 11\" (1.803 m)   Wt 131 lb 2.8 oz (59.5 kg)   SpO2 94%   BMI 18.30 kg/m²     General appearance: elderly, not distressed. Conteh in place   HEENT: Pupils equal, round, and reactive to light. Conjunctivae/corneas clear. Neck: Supple, with full range of motion. No jugular venous distention. Respiratory:  Normal respiratory effort. Clear to auscultation, bilaterally without Rales/Wheezes/Rhonchi. Cardiovascular: irreg rate and rhythm,  with normal S1/S2 without murmurs, rubs or gallops. Abdomen: Soft, non-tender, non-distended with normal bowel sounds. Musculoskeletal: No clubbing, cyanosis or edema bilaterally.   Full range of motion without deformity. Skin: Skin color, texture, turgor normal.  No rashes or lesions. Neurologic:   grossly non-focal.  Psychiatric: sleepy but arousable, oriented to person, not place or time   Peripheral Pulses: +2 palpable, equal bilaterally       Labs:   Recent Labs     07/07/20  0530 07/08/20  0821   WBC 10.0 12.0*   HGB 16.2 15.6   HCT 48.0 46.0   PLT 61* 58*     Recent Labs     07/07/20  0530 07/08/20  0821 07/09/20  0819   * 130* 132*   K 4.7 4.1 4.3   CL 98* 96* 97*   CO2 21 20* 19*   BUN 27* 31* 31*   CREATININE 1.2 1.1 0.8   CALCIUM 8.4 8.3 8.3     Recent Labs     07/07/20  0530 07/08/20  0821   * 161*   * 191*   BILIDIR 0.7*  --    BILITOT 1.9* 1.7*   ALKPHOS 83 82     Recent Labs     07/07/20  0530 07/08/20  1009   INR 2.14* 2.34*     No results for input(s): Shayy Earnestine in the last 72 hours. Urinalysis:      Lab Results   Component Value Date    NITRU Negative 07/07/2020    WBCUA 0-2 07/07/2020    BACTERIA 1+ 07/07/2020    RBCUA 0-2 07/07/2020    BLOODU TRACE-INTACT 07/07/2020    SPECGRAV >=1.030 07/07/2020    GLUCOSEU Negative 07/07/2020       Radiology:  US GALLBLADDER RUQ   Final Result   Stones and sludge within the gallbladder. No biliary ductal dilatation      Right-sided pleural effusion      Mild lobular contour of the liver is seen, raising the question of early   cirrhotic changes         CT HEAD WO CONTRAST   Final Result   No acute intracranial abnormality. XR CHEST PORTABLE   Final Result   Pulmonary venous hypertension with low left larger than right pleural   effusions and bibasilar atelectasis                 Assessment/Plan:    Active Hospital Problems    Diagnosis    Atrial fibrillation with RVR (HCC) [I48.91]       afib with RVR - rate better controlled, continue with BB, diltiazem. Will hold anticoagulation given baseline coagulopathy until further evaluation. Appreciate cardiology recs. Optimize electrolytes.  Check BNP Acute encephalopathy - likely delirium in setting of chronic dementia, wife reports some hallucinations which have been occurring at home for several years. Check ammonia. Avoid restraints. Continue delirium precautions. Sitter at bedside if possible     Suspected CAD - continue ASA, holding statin 2/2 elevated LFTs     R Pleural effusion - nodules noted on imaging from 2/20, repeat CT chest ordered. Coagulopathy - unclear if due to malnutrition/vit K deficiency or underlying hepatic dysfunction. Hold anticoagulation. Ordered Vit K today, trend INR     Hyponatremia - stable, continue with IVF, free water restriction     transaminitis - ?due to hepatic congestion 2/2 cardiac issues. Hep panel negative. RUQ ultrasound with possible early cirrhotic changes though no ETOH history. Monitor serial LFTs     COPD - controlled, continue Symbicort, albuterol PRN     Urinary retention - continue with coronado in place, continue flomax. Urology following.  Voiding trial possibly 7/10       DVT Prophylaxis: hold eliquis   Diet: Dietary Nutrition Supplements: Frozen Oral Supplement  DIET GENERAL; Daily Fluid Restriction: 1000 ml  Code Status: Full Code    PT/OT Eval Status: consulted     Dispo - 1-2 d pending improvement in encephalopathy     Ashleigh Valentine MD

## 2020-07-09 NOTE — PROGRESS NOTES
Patient is calm and cooperative and has been all shift. Restraints discontinued. Wife at bedside. Plans for sitter for when the Wife leaves. RN will continue to monitor.

## 2020-07-10 NOTE — PROGRESS NOTES
[address]   Daily Progress Note    Admit Date:  7/6/2020  HPI:    Chief Complaint   Patient presents with    Fatigue     hgx afib . . has been weak for last week or so. Conchetta Peaks afib rvr. ...in 150's tried vagal  with slight response        Interval history: Tom Londono is being followed for elevated troponin, afib, CHF. Subjective:  Mr. Franci Joyce is confused. Pulling at things. Doesn't open eyes but does answer some questions. Objective:   BP (!) 102/51   Pulse 80   Temp 97.4 °F (36.3 °C) (Axillary)   Resp 16   Ht 5' 11\" (1.803 m)   Wt 131 lb 2.8 oz (59.5 kg)   SpO2 94%   BMI 18.30 kg/m²       Intake/Output Summary (Last 24 hours) at 7/10/2020 1501  Last data filed at 7/10/2020 1416  Gross per 24 hour   Intake 1216 ml   Output 300 ml   Net 916 ml       NYHA: V    Physical Exam:  General:  Awake, alert, NAD  Skin:  Warm and dry  Neck:  JVD ~16 cm H20  Chest:  + crackles to auscultation,   Cardiovascular:  Irregular rhtym, reg rate S1S2  Abdomen:  Soft, nontender, +bowel sounds  Extremities: + 1 dependent edema    Medications:    melatonin ER  6 mg Oral Nightly    finasteride  5 mg Oral Daily    tamsulosin  0.4 mg Oral Daily    levothyroxine  75 mcg Oral Daily    metoprolol tartrate  25 mg Oral BID    tiotropium  2 puff Inhalation Daily    budesonide-formoterol  2 puff Inhalation BID    sodium chloride flush  10 mL Intravenous 2 times per day    dilTIAZem  30 mg Oral 4 times per day         Lab Data:  CBC:   Recent Labs     07/08/20  0821   WBC 12.0*   HGB 15.6   PLT 58*     BMP:    Recent Labs     07/08/20  0821 07/09/20  0819 07/10/20  0843   * 132* 131*   K 4.1 4.3 3.6   CO2 20* 19* 26   BUN 31* 31* 30*   CREATININE 1.1 0.8 0.7*       INR:    Recent Labs     07/08/20  1009 07/10/20  0843   INR 2.34* 1.99*     BNP:    Recent Labs     07/09/20  1502   PROBNP 27,252*     No results found for: LVEF, LVEFMODE     Impression:       Moderate layering bilateral pleural effusions.  Patchy heterogeneous at the  lung bases, greatest within the lower lobes, which can reflect atelectasis  and/or bilateral pneumonia. Anasarca of soft tissues. Atherosclerosis, including coronary artery calcification. Echo 7/9/20   Summary   Patient tachy during study. The left ventricular systolic function is severely reduced with an ejection   fraction of 10-15 %. Severe global hypokinesis. Left ventricular diastolic filling pressure is elevated. Moderate mitral regurgitation. Mild aortic, tricuspid and pulmonic regurgitation. Right ventricular systolic function is moderately reduced . Systolic pulmonic artery pressure (SPAP) is estimated at 57 mmHg consistent   with moderate pulmonary hypertension (Right atrial pressure of 15 mmHg). Stress test 2013  Interpetation Summary:  The LV EF is 31%  There is moderate to severe inferior wall hypokinesis. There is a medium sized, fixed defect in the inferior wall. 1.Non-diagnostic ECG for ischemia with pharmocologic stress. 2.Negative nuclear stress imaging for inducible ischemia. 3.Inferior infarct. Active Problems:    Atrial fibrillation with RVR ([facility])  Resolved Problems:    * No resolved hospital problems. *      Assessment/Plan:  Systolic heart failure LVEF 30-35% in 2013 now LVEF down to 10-15%  ~elevated troponin  ~Hx of CAD; s/p PCI 2008 per [facility]. Afib with rvr  PAF  transaminitis  Failure to thrive  Dementia  blindness    Plan:  D/c diltiazem  D/c metoprolol change to toprol  Give 1 time dose of IV lasix for comfort     Long discussion with wife- patient has had progressive decline over the last several months. Discussed severely depressed LVEF and his malnutrition. Wife declines any invasive testing and I would not recommend invasive testing given his poor functional status. Prognosis is poor and wife wants to take him home.   Code status changed to [facility]  Recommend hospice, wife agrees and wants to get him home soon      Sim Allen, CNP, 7/10/2020, 4:03 PM

## 2020-07-10 NOTE — PROGRESS NOTES
Physical Therapy    Physical therapy attempted to treat this patient. However patient's RN confirmed that patient is going to be going home on hospice. PT signing off. Maurice Ortiz PT.

## 2020-07-10 NOTE — CARE COORDINATION
Pt has referral to [facility] who is able to accept him at [place]. CM will continue to follow and assist as able.      Laury ChavezorWILDER

## 2020-07-10 NOTE — PROGRESS NOTES
Urology Progress Note      Subjective:   Pt sleepy  Wakes when he is touched    Vitals:  /63   Pulse 70   Temp 97.3 °F (36.3 °C) (Axillary)   Resp 16   Ht 5' 11\" (1.803 m)   Wt 131 lb 2.8 oz (59.5 kg)   SpO2 91%   BMI 18.30 kg/m²   Temp  Av.2 °F (36.2 °C)  Min: 96.2 °F (35.7 °C)  Max: 97.6 °F (36.4 °C)    Intake/Output Summary (Last 24 hours) at 7/10/2020 0919  Last data filed at 2020 2318  Gross per 24 hour   Intake 1016 ml   Output 325 ml   Net 691 ml       Exam:   Coronado with brown urine    Labs:  WBC:    Lab Results   Component Value Date    WBC 12.0 2020     Hemoglobin/Hematocrit:    Lab Results   Component Value Date    HGB 15.6 2020    HCT 46.0 2020     BMP:    Lab Results   Component Value Date     2020    K 4.3 2020    CL 97 2020    CO2 19 2020    BUN 31 2020    LABALBU 3.2 2020    CREATININE 0.8 2020    CALCIUM 8.3 2020    GFRAA >60 2020    LABGLOM >60 2020     PT/INR:    Lab Results   Component Value Date    PROTIME 27.4 2020    INR 2.34 2020     PTT:  No results found for: APTT[APTT    Urinalysis:     Urine Culture:      Blood Culture:      Antibiotic Therapy:      Imaging:       Impression/Plan:   Continue coronado until mental status improves    Annemarie Sunshine PA-C

## 2020-07-10 NOTE — PROGRESS NOTES
Hospitalist Progress Note      PCP: Prabhjot Aquino DO    Date of Admission: 7/6/2020    Chief Complaint: fatigue, confusion    Hospital Course: Still confused, calm and cooperative today. Complains of right elbow pain. Noted echocardiogram which showed very low left ventricular ejection fraction. Recent baseline unknown. Cardiac stress test performed in 2013 showed left ventricular ejection fraction of 31%. At that time, patient had inferior hypokinesis. This time, echocardiogram shows global hypokinesis. Subjective:     Denies chest pain or shortness of breath. Remains confused. Echolalia noted. Medications:  Reviewed    Infusion Medications     Scheduled Medications    melatonin ER  6 mg Oral Nightly    finasteride  5 mg Oral Daily    tamsulosin  0.4 mg Oral Daily    levothyroxine  75 mcg Oral Daily    metoprolol tartrate  25 mg Oral BID    tiotropium  2 puff Inhalation Daily    budesonide-formoterol  2 puff Inhalation BID    sodium chloride flush  10 mL Intravenous 2 times per day    dilTIAZem  30 mg Oral 4 times per day     PRN Meds: haloperidol lactate, sodium chloride flush, acetaminophen **OR** acetaminophen, polyethylene glycol      Intake/Output Summary (Last 24 hours) at 7/10/2020 1347  Last data filed at 7/10/2020 1243  Gross per 24 hour   Intake 1166 ml   Output 300 ml   Net 866 ml       Physical Exam Performed:    BP (!) 102/51   Pulse 80   Temp 97.4 °F (36.3 °C) (Axillary)   Resp 16   Ht 5' 11\" (1.803 m)   Wt 131 lb 2.8 oz (59.5 kg)   SpO2 94%   BMI 18.30 kg/m²     General appearance: Comfortable, cooperative. HEENT: Pupils equal, round, and reactive to light. Conjunctivae/corneas clear. Neck: Supple, with full range of motion. No jugular venous distention. Respiratory:  Normal respiratory effort. Clear to auscultation, bilaterally without Rales/Wheezes/Rhonchi.   Cardiovascular: Irregularly irregular rhythm, normal S1/S2 without murmurs, rubs or

## 2020-07-10 NOTE — PROGRESS NOTES
Department of Internal Medicine  Nephrology Progress Note        SUBJECTIVE:    We are following this patient for Hyponatremia. The patient was seen and examined; he feels well today with no CP, SOB, nausea or vomiting. ROS: No fever or chills. Social: Family at bedside. Physical Exam:    VITALS:  BP (!) 102/51   Pulse 80   Temp 97.4 °F (36.3 °C) (Axillary)   Resp 16   Ht 5' 11\" (1.803 m)   Wt 131 lb 2.8 oz (59.5 kg)   SpO2 94%   BMI 18.30 kg/m²     General appearance: Seems comfortable, no acute distress. Neck: Trachea midline, thyroid normal.   Lungs:  Non labored breathing, CTA to anterior auscultation. Heart:  S1S2 normal, rub or gallop. No peripheral edema. Abdomen: Soft, non-tender, no organomegaly. Skin: No lesions or rashes, warm to touch. DATA:    CBC:   Lab Results   Component Value Date    WBC 12.0 07/08/2020    RBC 4.73 07/08/2020    HGB 15.6 07/08/2020    HCT 46.0 07/08/2020    MCV 97.4 07/08/2020    MCH 33.0 07/08/2020    MCHC 33.9 07/08/2020    RDW 14.5 07/08/2020    PLT 58 07/08/2020    MPV 12.2 07/08/2020     BMP:    Lab Results   Component Value Date     07/10/2020    K 3.6 07/10/2020    CL 95 07/10/2020    CO2 26 07/10/2020    BUN 30 07/10/2020    LABALBU 2.7 07/10/2020    CREATININE 0.7 07/10/2020    CALCIUM 7.5 07/10/2020    GFRAA >60 07/10/2020    LABGLOM >60 07/10/2020    GLUCOSE 109 07/10/2020       IMPRESSION/RECOMMENDATIONS:      1- Hyponatremia: Multifactorial and likely secondary to increased free water intake along with decreased solutes and hypovolemia; serum sodium fluctuating between 130 and 132 meq/L; continue daily free water restriction and monitor. 2- Atrial fibrillation: Management per cardiology.

## 2020-07-10 NOTE — PROGRESS NOTES
Occupational Therapy    Therapist attempted to see pt for Ot follow up session. Upon arrival, pt asleep with sitter at bedside. Pt's spouse reports he did not sleep at all last and she would like for him to be left alone at this time. Pt's spouse politely declining therapy at this time. Therapy will be re-attempted as schedule allows.     Elton Lopez, OTR/L

## 2020-07-10 NOTE — CONSULTS
[address] [id number] Continence Nurse  Consult Note       NAME:  Gely Silva  MEDICAL RECORD NUMBER:  [id number]  AGE: 79 y.o. GENDER: male  : 1940  TODAY'S DATE:  7/10/2020    Subjective  Wife at bed side and reports the skin is very fragile and bruised. Reason for WOCN Evaluation and Assessment: large blister on elbow      Gely Silva is a 79 y.o. male referred by:   [] Physician  [x] Nursing  [] Other:     Wound Identification:  Wound Type: skin tears right and left lower arms and hands. Skin very fragile and ecchymosis  Contributing Factors: shear force and trauma wrist restraints    Wound History: Reported that patient was in bilateral soft wrist restraints and was thrashing about in bed. Current Wound Care Treatment:      Patient Goal of Care:  [] Wound Healing  [] Odor Control  [x] Palliative Care  [x] Pain Control   [] Other:         PAST MEDICAL HISTORY        Diagnosis Date    CAD (coronary artery disease)     Emphysema of lung ([facility] [address]        PAST SURGICAL HISTORY    Past Surgical History:   Procedure Laterality Date    CATARACT REMOVAL      HERNIA REPAIR         FAMILY HISTORY    Family History   Problem Relation Age of Onset    Heart Failure Mother     Macular Degen Mother     COPD Brother     Hypertension Son        SOCIAL HISTORY    Social History     Tobacco Use    Smoking status: Never Smoker    Smokeless tobacco: Never Used   Substance Use Topics    Alcohol use: Not Currently    Drug use: Never       ALLERGIES    No Known Allergies    MEDICATIONS    No current facility-administered medications on file prior to encounter. Current Outpatient Medications on File Prior to Encounter   Medication Sig Dispense Refill    levothyroxine (SYNTHROID) 50 MCG tablet       SYMBICORT 160-4.5 MCG/ACT AERO       SPIRIVA HANDIHALER 18 MCG inhalation capsule          Objective  Current dressings with red drainage.     /71   Pulse 84   Temp 97.4 °F (36.3 °C) (Axillary) Resp 16   Ht 5' 11\" (1.803 m)   Wt 131 lb 2.8 oz (59.5 kg)   SpO2 91%   BMI 18.30 kg/m²     LABS:  WBC:    Lab Results   Component Value Date    WBC 12.0 07/08/2020     H/H:    Lab Results   Component Value Date    HGB 15.6 07/08/2020    HCT 46.0 07/08/2020     PTT:  No results found for: APTT, PTT[APTT}  PT/INR:    Lab Results   Component Value Date    PROTIME 23.2 07/10/2020    INR 1.99 07/10/2020     HgBA1c:    Lab Results   Component Value Date    LABA1C 6.1 07/06/2020       Assessment  Red    Ez Risk Score: Ez Scale Score: 14    Patient Active Problem List   Diagnosis    Atrial fibrillation with RVR ([facility] [address]       Measurements:  Wound 07/10/20 Wrist Anterior;Right open, red draining (Active)   Wound Image   7/10/2020  5:00 PM   Wound Traumatic 7/10/2020  5:00 PM   Dressing Status Changed 7/10/2020  5:00 PM   Dressing Changed Changed/New 7/10/2020  5:00 PM   Dressing/Treatment Silicone EFNNKI;3E4;HWCL gauze 7/10/2020  5:00 PM   Wound Cleansed Rinsed/Irrigated with saline 7/10/2020  5:00 PM   Dressing Change Due 07/11/20 7/10/2020  5:00 PM   Wound Length (cm) 3.2 cm 7/10/2020  5:00 PM   Wound Width (cm) 3.5 cm 7/10/2020  5:00 PM   Wound Depth (cm) 0.1 cm 7/10/2020  5:00 PM   Wound Surface Area (cm^2) 11.2 cm^2 7/10/2020  5:00 PM   Wound Volume (cm^3) 1.12 cm^3 7/10/2020  5:00 PM   Distance Tunneling (cm) 0 cm 7/10/2020  5:00 PM   Tunneling Position ___ O'Clock 0 7/10/2020  5:00 PM   Undermining Starts ___ O'Clock 0 7/10/2020  5:00 PM   Undermining Ends___ O'Clock 0 7/10/2020  5:00 PM   Undermining Maxium Distance (cm) 0 7/10/2020  5:00 PM   Wound Assessment Drainage; Red 7/10/2020  5:00 PM   Drainage Amount Small 7/10/2020  5:00 PM   Drainage Description Serosanguinous 7/10/2020  5:00 PM   Odor None 7/10/2020  5:00 PM   Margins Attached edges; Defined edges 7/10/2020  5:00 PM   Isis-wound Assessment Fragile;Ecchymosis 7/10/2020  5:00 PM   Non-staged Wound Description Partial thickness 7/10/2020  5:00 PM Red%Wound Bed 100 7/10/2020  5:00 PM   Culture Taken No 7/10/2020  5:00 PM   Number of days: 0      Right wrist:           Wound 07/10/20 Arm Right; Lower open red, draining (Active)   Wound Image   7/10/2020  5:00 PM   Wound Skin Tear 7/10/2020  5:00 PM   Dressing Status Changed 7/10/2020  5:00 PM   Dressing Changed Changed/New 7/10/2020  5:00 PM   Dressing/Treatment Silicone [id number];4W8;LKVU gauze 7/10/2020  5:00 PM   Wound Cleansed Rinsed/Irrigated with saline 7/10/2020  5:00 PM   Dressing Change Due 07/11/20 7/10/2020  5:00 PM   Wound Length (cm) 5.5 cm 7/10/2020  5:00 PM   Wound Width (cm) 2.5 cm 7/10/2020  5:00 PM   Wound Depth (cm) 0.1 cm 7/10/2020  5:00 PM   Wound Surface Area (cm^2) 13.75 cm^2 7/10/2020  5:00 PM   Wound Volume (cm^3) 1.38 cm^3 7/10/2020  5:00 PM   Distance Tunneling (cm) 0 cm 7/10/2020  5:00 PM   Tunneling Position ___ O'Clock 0 7/10/2020  5:00 PM   Undermining Starts ___ O'Clock 0 7/10/2020  5:00 PM   Undermining Ends___ O'Clock 0 7/10/2020  5:00 PM   Undermining Maxium Distance (cm) 0 7/10/2020  5:00 PM   Wound Assessment Red 7/10/2020  5:00 PM   Drainage Amount Small 7/10/2020  5:00 PM   Drainage Description Serosanguinous; Serous 7/10/2020  5:00 PM   Odor None 7/10/2020  5:00 PM   Margins Attached edges; Defined edges 7/10/2020  5:00 PM   Red%Wound Bed 100 7/10/2020  5:00 PM   Culture Taken No 7/10/2020  5:00 PM   Number of days: 0      Right lower arm:           Wound 07/10/20 Brachial Right; Inner raised clear filled bulla (Active)   Wound Image    7/10/2020  5:00 PM   Wound Traumatic 7/10/2020  5:00 PM   Dressing Changed Changed/New 7/10/2020  5:00 PM   Dressing/Treatment Roll gauze 7/10/2020  5:00 PM   Wound Cleansed Rinsed/Irrigated with saline 7/10/2020  5:00 PM   Dressing Change Due 07/11/20 7/10/2020  5:00 PM   Wound Length (cm) 6 cm 7/10/2020  5:00 PM   Wound Width (cm) 5 cm 7/10/2020  5:00 PM   Wound Surface Area (cm^2) 30 cm^2 7/10/2020  5:00 PM   Distance Tunneling (cm) 0 cm understanding       [] Needs reinforcement  [] Unsuccessful      [] Verbal Understanding  [] Demonstrated understanding       [] No evidence of learning  [] Refused teaching         [] N/A       Electronically signed by Zakia Merlos RN, MSN, [facility] Adjutant on 7/10/2020 at 6:23 PM

## 2020-07-11 NOTE — PLAN OF CARE
Problem: Falls - Risk of:  Goal: Will remain free from falls  Description: Will remain free from falls  Outcome: Ongoing  Note: Bed in lowest, locked position, side rails up X2, nonskid socks on, call light within reach. Pt's wife bedside along with a sitter, [employer] in room, will continue to monitor.

## 2020-07-11 NOTE — PLAN OF CARE
Discharge instructions reviewed with patient and spouse. All home medications have been reviewed, questions answered and patient verbalized understanding. Discharge instructions signed and copies given. Patient discharged home with belongings.

## 2020-07-11 NOTE — PROGRESS NOTES
left larger than right pleural   effusions and bibasilar atelectasis                 Assessment/Plan:    Active Hospital Problems    Diagnosis    Atrial fibrillation with RVR ([facility]) [I48.91]       PLAN:    Atrial fibrillation with rapid ventricular response  Heart rate acceptable. Patient does not wish to proceed with any aggressive questions and wants to go ahead with hospice. Meeting pending. Acute metabolic encephalopathy  Has baseline dementia with hallucinations and confusion episodes at home for a long time. Improved. Wife at bedside    Ischemic cardiomyopathy  Severe impairment of left ventricular ejection fraction noted. Hospice appropriate in the absence of aggressive management, which is unlikely to be helpful anyways. Elevated transaminases  I believe this is secondary to congestion. No need to check blood work    COPD without exacerbation  Continue Symbicort and albuterol. Urinary retention  Has Conteh. If discharged to hospice, we will keep Conteh in. No need for medication management. Thrombocytopenia  Unclear reason. No point in further work-up. Hospice candidate. Discussed with patient and wife  Discussed with nursing      DVT Prophylaxis: SCD  Diet: Dietary Nutrition Supplements: Frozen Oral Supplement  DIET GENERAL; Daily Fluid Restriction: 1000 ml  Code Status: DNR-CC    PT/OT Eval Status: Not needed    Dispo -home with hospice care once arrangements are made. Could be as soon as today.     Nick Perez MD

## 2020-07-11 NOTE — CARE COORDINATION
CM spoke with patients wife, Geo Montero, in regards to hospice consult. Geo Montero would like a referral to hospice of [facility]. Referral made. Hospice RN will call wife to set up at meeting time.

## 2020-07-11 NOTE — PROGRESS NOTES
Urology Progress Note    Subjective: I am ok. HPI: Patient has been admitted for failure to thrive, CHF. He is in retention. To be transferred to home hospice today. P/E  /66   Pulse 94   Temp 97.6 °F (36.4 °C) (Axillary)   Resp 16   Ht 5' 11\" (1.803 m)   Wt 131 lb 2.8 oz (59.5 kg)   SpO2 92%   BMI 18.30 kg/m²   Skin: Intact  Respiratory: Breathing without difficulty, stable. GI: No acute changes, stable po intake. : coronado in place. MSK: No acute changes, stable. Neuro: No acute changes, stable. Labs  Lab Results   Component Value Date    WBC 12.6 07/11/2020    HGB 15.3 07/11/2020    HCT 45.9 07/11/2020    MCV 96.8 07/11/2020    PLT 15 07/11/2020     Lab Results   Component Value Date     07/11/2020    K 3.7 07/11/2020    CL 93 07/11/2020    CO2 24 07/11/2020    BUN 29 07/11/2020    CREATININE 0.8 07/11/2020    CALCIUM 8.2 07/11/2020       Assessment/Plan  Stable, discharge pending today. F/U for routine catheter care.     Electronically signed by Kaylynn Aquino MD on 7/11/2020 at 4:30 PM

## 2020-07-11 NOTE — DISCHARGE SUMMARY
Hospital Medicine Discharge Summary    Patient ID: Myriam iSm      Patient's PCP: Makeda Guzman DO    Admit Date: 7/6/2020     Discharge Date:   07/11/20     Admitting Physician: Vijaya Alcaraz MD     Discharge Physician: Bhupendra Salas MD     Discharge Diagnoses: Active Hospital Problems    Diagnosis    Atrial fibrillation with RVR (HCC) [I48.91]     Acute on chronic systolic congestive heart failure  Hypertension  Dyslipidemia    The patient was seen and examined on day of discharge and this discharge summary is in conjunction with any daily progress note from day of discharge. Hospital Course:  Patient was admitted with shortness of breath. Diagnosed with atrial fibrillation with rapid ventricular response. Patient and wife chose to proceed with hospice care, after significantly reduced left ventricular ejection fraction was noted on updated echocardiogram.  Hospice consulted  Patient will be discharged home with home hospice. Comfort medications prescribed      Physical Exam Performed:     /66   Pulse 94   Temp 97.6 °F (36.4 °C) (Axillary)   Resp 16   Ht 5' 11\" (1.803 m)   Wt 131 lb 2.8 oz (59.5 kg)   SpO2 92%   BMI 18.30 kg/m²     General appearance: Comfortable, cooperative. HEENT: Pupils equal, round, and reactive to light. Conjunctivae/corneas clear. Neck: Supple, with full range of motion. No jugular venous distention. Respiratory:  Normal respiratory effort. Clear to auscultation, bilaterally without Rales/Wheezes/Rhonchi. Cardiovascular: Irregularly irregular rhythm, normal S1/S2 without murmurs, rubs or gallops. Abdomen: Soft, non-tender, non-distended with normal bowel sounds. Musculoskeletal: No clubbing, cyanosis or edema bilaterally. Full range of motion without deformity. Skin: Skin color, texture, turgor normal.  No rashes or lesions. Stage I decubitus with mild erythema on the right elbow.   Neurologic:   grossly non-focal.  Psychiatric: sleepy but arousable, oriented to person, and place but not time  Peripheral Pulses: +2 palpable, equal bilaterally         Labs: For convenience and continuity at follow-up the following most recent labs are provided:      CBC:    Lab Results   Component Value Date    WBC 12.6 07/11/2020    HGB 15.3 07/11/2020    HCT 45.9 07/11/2020    PLT 15 07/11/2020       Renal:    Lab Results   Component Value Date     07/11/2020    K 3.7 07/11/2020    CL 93 07/11/2020    CO2 24 07/11/2020    BUN 29 07/11/2020    CREATININE 0.8 07/11/2020    CALCIUM 8.2 07/11/2020         Significant Diagnostic Studies    Radiology:   XR ELBOW RIGHT (MIN 3 VIEWS)   Final Result   No evidence of acute fracture or dislocation. CT CHEST WO CONTRAST   Final Result   Moderate layering bilateral pleural effusions. Patchy heterogeneous at the   lung bases, greatest within the lower lobes, which can reflect atelectasis   and/or bilateral pneumonia. Anasarca of soft tissues. Atherosclerosis, including coronary artery calcification. US GALLBLADDER RUQ   Final Result   Stones and sludge within the gallbladder. No biliary ductal dilatation      Right-sided pleural effusion      Mild lobular contour of the liver is seen, raising the question of early   cirrhotic changes         CT HEAD WO CONTRAST   Final Result   No acute intracranial abnormality.          XR CHEST PORTABLE   Final Result   Pulmonary venous hypertension with low left larger than right pleural   effusions and bibasilar atelectasis                Consults:     IP CONSULT TO HOSPITALIST  IP CONSULT TO CARDIOLOGY  IP CONSULT TO NEPHROLOGY  IP CONSULT TO UROLOGY  IP CONSULT TO HOSPICE    Disposition: Home with hospice    Condition at Discharge: Terminal    Discharge Instructions/Follow-up: Home with hospice    Code Status:  DNR-CC     Activity: activity as tolerated    Diet: regular diet      Discharge Medications:     Current Discharge Medication List

## 2020-07-24 ENCOUNTER — TELEPHONE (OUTPATIENT)
Dept: PULMONOLOGY | Age: 80
End: 2020-07-24